# Patient Record
Sex: FEMALE | Race: WHITE | NOT HISPANIC OR LATINO | Employment: STUDENT | URBAN - METROPOLITAN AREA
[De-identification: names, ages, dates, MRNs, and addresses within clinical notes are randomized per-mention and may not be internally consistent; named-entity substitution may affect disease eponyms.]

---

## 2017-11-08 DIAGNOSIS — Z01.419 ENCOUNTER FOR GYNECOLOGICAL EXAMINATION (GENERAL) (ROUTINE) WITHOUT ABNORMAL FINDINGS: ICD-10-CM

## 2017-11-08 RX ORDER — ETONOGESTREL AND ETHINYL ESTRADIOL VAGINAL RING .015; .12 MG/D; MG/D
1 RING VAGINAL
Qty: 1 EACH | Refills: 3 | Status: SHIPPED | OUTPATIENT
Start: 2017-11-08 | End: 2018-01-31 | Stop reason: SDUPTHER

## 2018-01-31 ENCOUNTER — OFFICE VISIT (OUTPATIENT)
Dept: OBSTETRICS AND GYNECOLOGY | Facility: CLINIC | Age: 23
End: 2018-01-31
Attending: OBSTETRICS & GYNECOLOGY
Payer: COMMERCIAL

## 2018-01-31 VITALS
DIASTOLIC BLOOD PRESSURE: 80 MMHG | BODY MASS INDEX: 27.83 KG/M2 | HEIGHT: 64 IN | SYSTOLIC BLOOD PRESSURE: 108 MMHG | WEIGHT: 163 LBS

## 2018-01-31 DIAGNOSIS — Z01.419 ENCOUNTER FOR GYNECOLOGICAL EXAMINATION (GENERAL) (ROUTINE) WITHOUT ABNORMAL FINDINGS: ICD-10-CM

## 2018-01-31 DIAGNOSIS — Z12.4 ENCOUNTER FOR PAPANICOLAOU SMEAR FOR CERVICAL CANCER SCREENING: Primary | ICD-10-CM

## 2018-01-31 PROCEDURE — 88175 CYTOPATH C/V AUTO FLUID REDO: CPT

## 2018-01-31 PROCEDURE — 99999 PR PBB SHADOW E&M-EST. PATIENT-LVL III: CPT | Mod: PBBFAC,,, | Performed by: OBSTETRICS & GYNECOLOGY

## 2018-01-31 PROCEDURE — 99395 PREV VISIT EST AGE 18-39: CPT | Mod: S$GLB,,, | Performed by: OBSTETRICS & GYNECOLOGY

## 2018-01-31 RX ORDER — ETONOGESTREL AND ETHINYL ESTRADIOL VAGINAL RING .015; .12 MG/D; MG/D
1 RING VAGINAL
Qty: 3 EACH | Refills: 3 | Status: SHIPPED | OUTPATIENT
Start: 2018-01-31 | End: 2018-12-31 | Stop reason: SDUPTHER

## 2018-01-31 NOTE — PROGRESS NOTES
Subjective:       Patient ID: Kailee Messer is a 22 y.o. female.    Chief Complaint:  Annual Exam (pap normal 10/7/16 - pt has been spotting through nuvaring)      There is no problem list on file for this patient.      History of Present Illness  22 y.o. yo  here for annual exam. No gyn complaints. Doing well. Happy with Nuvaring, occasionally has spotting but she says she is not putting it in at the right time sometimes. Wants to continue. rec reminder on phone. All questions answered        Past Medical History:   Diagnosis Date    Acid reflux     Dysmenorrhea        Past Surgical History:   Procedure Laterality Date    FOOT SURGERY Left     WISDOM TOOTH EXTRACTION         OB History    Para Term  AB Living   0 0 0 0 0 0   SAB TAB Ectopic Multiple Live Births   0 0 0 0               Patient's last menstrual period was 2018.   Date of Last Pap: 10/14/2016    Review of Systems  Review of Systems   Constitutional: Negative for fatigue and unexpected weight change.   Respiratory: Negative for shortness of breath.    Cardiovascular: Negative for chest pain.   Gastrointestinal: Negative for abdominal pain, constipation, diarrhea, nausea and vomiting.   Genitourinary: Negative for dysuria.   Musculoskeletal: Negative for back pain.   Skin: Negative for rash.   Neurological: Negative for headaches.   Hematological: Does not bruise/bleed easily.   Psychiatric/Behavioral: Negative for behavioral problems.        Objective:   Physical Exam:   Constitutional: She is oriented to person, place, and time. Vital signs are normal. She appears well-developed and well-nourished. No distress.        Pulmonary/Chest: She exhibits no mass. Right breast exhibits no mass, no nipple discharge, no skin change, no tenderness, no bleeding and no swelling. Left breast exhibits no mass, no nipple discharge, no skin change, no tenderness, no bleeding and no swelling. Breasts are symmetrical.        Abdominal:  Soft. Normal appearance and bowel sounds are normal. She exhibits no distension and no mass. There is no tenderness. There is no rebound.     Genitourinary: Vagina normal and uterus normal. There is no rash, tenderness, lesion or injury on the right labia. There is no rash, tenderness, lesion or injury on the left labia. Uterus is not deviated, not enlarged, not fixed, not tender, not hosting fibroids and not experiencing uterine prolapse. Cervix is normal. Right adnexum displays no mass, no tenderness and no fullness. Left adnexum displays no mass, no tenderness and no fullness. No erythema, tenderness, rectocele, cystocele or unspecified prolapse of vaginal walls in the vagina. No vaginal discharge found. Cervix exhibits no motion tenderness, no discharge and no friability.           Musculoskeletal: Normal range of motion and moves all extremeties.      Lymphadenopathy:     She has no axillary adenopathy.        Right: No supraclavicular adenopathy present.        Left: No supraclavicular adenopathy present.    Neurological: She is alert and oriented to person, place, and time.    Skin: Skin is warm and dry.    Psychiatric: She has a normal mood and affect. Her behavior is normal. Judgment normal.        Assessment/ Plan:     1. Encounter for Papanicolaou smear for cervical cancer screening  Liquid-based pap smear, screening   2. Encounter for gynecological examination (general) (routine) without abnormal findings  etonogestrel-ethinyl estradiol (NUVARING) 0.12-0.015 mg/24 hr vaginal ring       Follow-up with me in 1 year

## 2018-12-28 ENCOUNTER — TELEPHONE (OUTPATIENT)
Dept: OBSTETRICS AND GYNECOLOGY | Facility: CLINIC | Age: 23
End: 2018-12-28

## 2018-12-31 DIAGNOSIS — Z01.419 ENCOUNTER FOR GYNECOLOGICAL EXAMINATION (GENERAL) (ROUTINE) WITHOUT ABNORMAL FINDINGS: ICD-10-CM

## 2018-12-31 RX ORDER — ETONOGESTREL AND ETHINYL ESTRADIOL VAGINAL RING .015; .12 MG/D; MG/D
1 RING VAGINAL
Qty: 3 EACH | Refills: 1 | Status: SHIPPED | OUTPATIENT
Start: 2018-12-31 | End: 2019-02-01 | Stop reason: SDUPTHER

## 2018-12-31 NOTE — TELEPHONE ENCOUNTER
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 rx needs to be sent to local pharmacy [facility]

## 2019-02-01 ENCOUNTER — LAB VISIT (OUTPATIENT)
Dept: LAB | Facility: HOSPITAL | Age: 24
End: 2019-02-01
Attending: OBSTETRICS & GYNECOLOGY
Payer: COMMERCIAL

## 2019-02-01 ENCOUNTER — OFFICE VISIT (OUTPATIENT)
Dept: OBSTETRICS AND GYNECOLOGY | Facility: CLINIC | Age: 24
End: 2019-02-01
Payer: COMMERCIAL

## 2019-02-01 VITALS
BODY MASS INDEX: 30.06 KG/M2 | WEIGHT: 176.06 LBS | SYSTOLIC BLOOD PRESSURE: 110 MMHG | HEIGHT: 64 IN | DIASTOLIC BLOOD PRESSURE: 74 MMHG

## 2019-02-01 DIAGNOSIS — Z01.419 ENCOUNTER FOR GYNECOLOGICAL EXAMINATION (GENERAL) (ROUTINE) WITHOUT ABNORMAL FINDINGS: ICD-10-CM

## 2019-02-01 DIAGNOSIS — Z12.4 ENCOUNTER FOR PAPANICOLAOU SMEAR FOR CERVICAL CANCER SCREENING: Primary | ICD-10-CM

## 2019-02-01 DIAGNOSIS — Z11.3 SCREENING EXAMINATION FOR STD (SEXUALLY TRANSMITTED DISEASE): ICD-10-CM

## 2019-02-01 PROCEDURE — 87340 HEPATITIS B SURFACE AG IA: CPT

## 2019-02-01 PROCEDURE — 88175 CYTOPATH C/V AUTO FLUID REDO: CPT

## 2019-02-01 PROCEDURE — 87491 CHLMYD TRACH DNA AMP PROBE: CPT

## 2019-02-01 PROCEDURE — 99999 PR PBB SHADOW E&M-EST. PATIENT-LVL III: CPT | Mod: PBBFAC,,, | Performed by: OBSTETRICS & GYNECOLOGY

## 2019-02-01 PROCEDURE — 86592 SYPHILIS TEST NON-TREP QUAL: CPT

## 2019-02-01 PROCEDURE — 99999 PR PBB SHADOW E&M-EST. PATIENT-LVL III: ICD-10-PCS | Mod: PBBFAC,,, | Performed by: OBSTETRICS & GYNECOLOGY

## 2019-02-01 PROCEDURE — 99395 PR PREVENTIVE VISIT,EST,18-39: ICD-10-PCS | Mod: S$GLB,,, | Performed by: OBSTETRICS & GYNECOLOGY

## 2019-02-01 PROCEDURE — 99395 PREV VISIT EST AGE 18-39: CPT | Mod: S$GLB,,, | Performed by: OBSTETRICS & GYNECOLOGY

## 2019-02-01 PROCEDURE — 86703 HIV-1/HIV-2 1 RESULT ANTBDY: CPT

## 2019-02-01 RX ORDER — ETONOGESTREL AND ETHINYL ESTRADIOL VAGINAL RING .015; .12 MG/D; MG/D
1 RING VAGINAL
Qty: 3 EACH | Refills: 3 | Status: SHIPPED | OUTPATIENT
Start: 2019-02-01 | End: 2020-01-17 | Stop reason: SDUPTHER

## 2019-02-01 NOTE — PROGRESS NOTES
Subjective:       Patient ID: Kailee Messer is a 23 y.o. female.    Chief Complaint:  Annual Exam (last pap 2018 normal)      There is no problem list on file for this patient.      History of Present Illness  23 y.o. yo  here for annual exam. No gyn complaints. Doing well. Wants STD testing. All questions answered      Past Medical History:   Diagnosis Date    Acid reflux     Dysmenorrhea        Past Surgical History:   Procedure Laterality Date    FOOT SURGERY Left     WISDOM TOOTH EXTRACTION         OB History    Para Term  AB Living   0 0 0 0 0 0   SAB TAB Ectopic Multiple Live Births   0 0 0 0               Patient's last menstrual period was 2019.   Date of Last Pap: 2018    Review of Systems  Review of Systems   Constitutional: Negative for fatigue and unexpected weight change.   Respiratory: Negative for shortness of breath.    Cardiovascular: Negative for chest pain.   Gastrointestinal: Negative for abdominal pain, constipation, diarrhea, nausea and vomiting.   Genitourinary: Negative for dysuria.   Musculoskeletal: Negative for back pain.   Skin: Negative for rash.   Neurological: Negative for headaches.   Hematological: Does not bruise/bleed easily.   Psychiatric/Behavioral: Negative for behavioral problems.        Objective:   Physical Exam:   Constitutional: She is oriented to person, place, and time. Vital signs are normal. She appears well-developed and well-nourished. No distress.        Pulmonary/Chest: She exhibits no mass. Right breast exhibits no mass, no nipple discharge, no skin change, no tenderness, no bleeding and no swelling. Left breast exhibits no mass, no nipple discharge, no skin change, no tenderness, no bleeding and no swelling. Breasts are symmetrical.        Abdominal: Soft. Normal appearance and bowel sounds are normal. She exhibits no distension and no mass. There is no tenderness. There is no rebound.     Genitourinary: Vagina normal and uterus  normal. There is no rash, tenderness, lesion or injury on the right labia. There is no rash, tenderness, lesion or injury on the left labia. Uterus is not deviated, not enlarged, not fixed, not tender, not hosting fibroids and not experiencing uterine prolapse. Cervix is normal. Right adnexum displays no mass, no tenderness and no fullness. Left adnexum displays no mass, no tenderness and no fullness. No erythema, tenderness, rectocele, cystocele or unspecified prolapse of vaginal walls in the vagina. No vaginal discharge found. Cervix exhibits no motion tenderness, no discharge and no friability.           Musculoskeletal: Normal range of motion and moves all extremeties.      Lymphadenopathy:     She has no axillary adenopathy.        Right: No supraclavicular adenopathy present.        Left: No supraclavicular adenopathy present.    Neurological: She is alert and oriented to person, place, and time.    Skin: Skin is warm and dry.    Psychiatric: She has a normal mood and affect. Her behavior is normal. Judgment normal.        Assessment/ Plan:     1. Encounter for Papanicolaou smear for cervical cancer screening  Liquid-based pap smear, screening   2. Screening examination for STD (sexually transmitted disease)  C. trachomatis/N. gonorrhoeae by AMP DNA    RPR    HIV 1/2 Ag/Ab (4th Gen)    Hepatitis B surface antigen   3. Encounter for gynecological examination (general) (routine) without abnormal findings  etonogestrel-ethinyl estradiol (NUVARING) 0.12-0.015 mg/24 hr vaginal ring       Follow-up with me in 1 year

## 2019-02-02 LAB — RPR SER QL: NORMAL

## 2019-02-04 LAB
HBV SURFACE AG SERPL QL IA: NEGATIVE
HIV 1+2 AB+HIV1 P24 AG SERPL QL IA: NEGATIVE

## 2019-02-05 LAB
C TRACH DNA SPEC QL NAA+PROBE: NOT DETECTED
N GONORRHOEA DNA SPEC QL NAA+PROBE: NOT DETECTED

## 2020-01-17 DIAGNOSIS — Z01.419 ENCOUNTER FOR GYNECOLOGICAL EXAMINATION (GENERAL) (ROUTINE) WITHOUT ABNORMAL FINDINGS: ICD-10-CM

## 2020-01-17 RX ORDER — ETONOGESTREL AND ETHINYL ESTRADIOL VAGINAL RING .015; .12 MG/D; MG/D
1 RING VAGINAL
Qty: 1 EACH | Refills: 0 | Status: SHIPPED | OUTPATIENT
Start: 2020-01-17 | End: 2020-02-07 | Stop reason: SDUPTHER

## 2020-01-17 NOTE — TELEPHONE ENCOUNTER
Lissette pt, is requesting a refill of her Nuvaring RX. Would like to have it filled at local [facility] pharmacy. Please advise once RX has been filled, thank you.

## 2020-02-07 ENCOUNTER — OFFICE VISIT (OUTPATIENT)
Dept: OBSTETRICS AND GYNECOLOGY | Facility: CLINIC | Age: 25
End: 2020-02-07
Payer: COMMERCIAL

## 2020-02-07 VITALS
WEIGHT: 185.19 LBS | HEIGHT: 64 IN | BODY MASS INDEX: 31.62 KG/M2 | SYSTOLIC BLOOD PRESSURE: 110 MMHG | DIASTOLIC BLOOD PRESSURE: 80 MMHG

## 2020-02-07 DIAGNOSIS — Z01.419 ENCOUNTER FOR GYNECOLOGICAL EXAMINATION (GENERAL) (ROUTINE) WITHOUT ABNORMAL FINDINGS: ICD-10-CM

## 2020-02-07 DIAGNOSIS — Z11.3 SCREEN FOR STD (SEXUALLY TRANSMITTED DISEASE): Primary | ICD-10-CM

## 2020-02-07 DIAGNOSIS — Z12.4 SCREENING FOR MALIGNANT NEOPLASM OF CERVIX: ICD-10-CM

## 2020-02-07 PROCEDURE — 99395 PR PREVENTIVE VISIT,EST,18-39: ICD-10-PCS | Mod: S$GLB,,, | Performed by: OBSTETRICS & GYNECOLOGY

## 2020-02-07 PROCEDURE — 99999 PR PBB SHADOW E&M-EST. PATIENT-LVL III: ICD-10-PCS | Mod: PBBFAC,,, | Performed by: OBSTETRICS & GYNECOLOGY

## 2020-02-07 PROCEDURE — 88175 CYTOPATH C/V AUTO FLUID REDO: CPT

## 2020-02-07 PROCEDURE — 99395 PREV VISIT EST AGE 18-39: CPT | Mod: S$GLB,,, | Performed by: OBSTETRICS & GYNECOLOGY

## 2020-02-07 PROCEDURE — 99999 PR PBB SHADOW E&M-EST. PATIENT-LVL III: CPT | Mod: PBBFAC,,, | Performed by: OBSTETRICS & GYNECOLOGY

## 2020-02-07 PROCEDURE — 87491 CHLMYD TRACH DNA AMP PROBE: CPT

## 2020-02-07 RX ORDER — ETONOGESTREL AND ETHINYL ESTRADIOL VAGINAL RING .015; .12 MG/D; MG/D
1 RING VAGINAL
Qty: 4 EACH | Refills: 3 | Status: SHIPPED | OUTPATIENT
Start: 2020-02-07 | End: 2021-03-15

## 2020-02-07 NOTE — PROGRESS NOTES
Subjective:       Patient ID: Kailee Messer is a 24 y.o. female.    Chief Complaint:  Well Woman (Annual --  last pap 19, Negative  )      There is no problem list on file for this patient.      History of Present Illness  24 y.o. yo  here for annual exam. No gyn complaints. Doing well on Nuvaring. All questions answered       Past Medical History:   Diagnosis Date    Acid reflux     Counseling for HPV (human papillomavirus) vaccination     Received all 3, per pt     Dysmenorrhea     Uses vaginal contraceptive ring        Past Surgical History:   Procedure Laterality Date    FOOT SURGERY Left     WISDOM TOOTH EXTRACTION         OB History    Para Term  AB Living   0 0 0 0 0 0   SAB TAB Ectopic Multiple Live Births   0 0 0 0     Obstetric Comments   Menarche ~ 10       Patient's last menstrual period was 2020 (exact date).   Date of Last Pap: 2019    Review of Systems  Review of Systems   Constitutional: Negative for fatigue and unexpected weight change.   Respiratory: Negative for shortness of breath.    Cardiovascular: Negative for chest pain.   Gastrointestinal: Negative for abdominal pain, constipation, diarrhea, nausea and vomiting.   Genitourinary: Negative for dysuria.   Musculoskeletal: Negative for back pain.   Skin: Negative for rash.   Neurological: Negative for headaches.   Hematological: Does not bruise/bleed easily.   Psychiatric/Behavioral: Negative for behavioral problems.        Objective:   Physical Exam:   Constitutional: She is oriented to person, place, and time. Vital signs are normal. She appears well-developed and well-nourished. No distress.        Pulmonary/Chest: She exhibits no mass. Right breast exhibits no mass, no nipple discharge, no skin change, no tenderness, no bleeding and no swelling. Left breast exhibits no mass, no nipple discharge, no skin change, no tenderness, no bleeding and no swelling. Breasts are symmetrical.        Abdominal:  Soft. Normal appearance and bowel sounds are normal. She exhibits no distension and no mass. There is no tenderness. There is no rebound.     Genitourinary: Vagina normal and uterus normal. There is no rash, tenderness, lesion or injury on the right labia. There is no rash, tenderness, lesion or injury on the left labia. Uterus is not deviated, not enlarged, not fixed, not tender, not hosting fibroids and not experiencing uterine prolapse. Cervix is normal. Right adnexum displays no mass, no tenderness and no fullness. Left adnexum displays no mass, no tenderness and no fullness. No erythema, tenderness, rectocele, cystocele or unspecified prolapse of vaginal walls in the vagina. No vaginal discharge found. Cervix exhibits no motion tenderness, no discharge and no friability.           Musculoskeletal: Normal range of motion and moves all extremeties.      Lymphadenopathy:     She has no axillary adenopathy.        Right: No supraclavicular adenopathy present.        Left: No supraclavicular adenopathy present.    Neurological: She is alert and oriented to person, place, and time.    Skin: Skin is warm and dry.    Psychiatric: She has a normal mood and affect. Her behavior is normal. Judgment normal.        Assessment/ Plan:     1. Screen for STD (sexually transmitted disease)  C. trachomatis/N. gonorrhoeae by AMP DNA   2. Encounter for gynecological examination (general) (routine) without abnormal findings  etonogestrel-ethinyl estradiol (NUVARING) 0.12-0.015 mg/24 hr vaginal ring   3. Screening for malignant neoplasm of cervix  Liquid-Based Pap Smear, Screening       Follow-up with me in 1 year

## 2020-02-08 LAB
C TRACH DNA SPEC QL NAA+PROBE: NOT DETECTED
N GONORRHOEA DNA SPEC QL NAA+PROBE: NOT DETECTED

## 2020-03-05 LAB
FINAL PATHOLOGIC DIAGNOSIS: NORMAL
Lab: NORMAL

## 2020-12-30 ENCOUNTER — CLINICAL SUPPORT (OUTPATIENT)
Dept: URGENT CARE | Facility: CLINIC | Age: 25
End: 2020-12-30
Payer: COMMERCIAL

## 2020-12-30 VITALS — OXYGEN SATURATION: 96 % | HEART RATE: 98 BPM | TEMPERATURE: 98 F

## 2020-12-30 DIAGNOSIS — Z11.9 ENCOUNTER FOR SCREENING EXAMINATION FOR INFECTIOUS DISEASE: Primary | ICD-10-CM

## 2020-12-30 LAB
CTP QC/QA: YES
SARS-COV-2 RDRP RESP QL NAA+PROBE: NEGATIVE

## 2020-12-30 PROCEDURE — 99211 OFF/OP EST MAY X REQ PHY/QHP: CPT | Mod: S$GLB,,, | Performed by: PHYSICIAN ASSISTANT

## 2020-12-30 PROCEDURE — 99211 PR OFFICE/OUTPT VISIT, EST, LEVL I: ICD-10-PCS | Mod: S$GLB,,, | Performed by: PHYSICIAN ASSISTANT

## 2020-12-30 PROCEDURE — U0002: ICD-10-PCS | Mod: QW,S$GLB,, | Performed by: PHYSICIAN ASSISTANT

## 2020-12-30 PROCEDURE — U0002 COVID-19 LAB TEST NON-CDC: HCPCS | Mod: QW,S$GLB,, | Performed by: PHYSICIAN ASSISTANT

## 2020-12-30 NOTE — PATIENT INSTRUCTIONS
Your test was NEGATIVE for COVID-19 (coronavirus).      You may leave home and/or return to work when the following conditions are met:  · 24 hours fever free without fever-reducing medications AND  · Improved symptoms  · You have not met the conditions of a close contact     What counts as a close contact?  · You were within 6 feet of someone who has COVID-19 for a total of 15 minutes or more (masked or unmasked).  · You provided care at home to someone who is sick with COVID-19.  · You had direct physical contact with the person (hugged or kissed them).  · You shared eating or drinking utensils.  · They sneezed, coughed, or somehow got respiratory droplets on you.     If you had a close contact:  · If possible, it is recommended that you quarantine for 14 days from the time of contact regardless of your test status.  · If you have no symptoms, quarantine may be stopped early at 10 days, but this carries a small risk of spreading the virus.  · If you have no symptoms and you have a negative COVID test on day 5 or later, quarantine may be stopped after day 7, but this also carries a small risk of spreading the virus.     Additional instructions:  · Social distance per your local guidelines  · Call ahead before visiting your doctor.  · Wear a mask when around others who do not live in your household.  · Cover your coughs and sneezes.  · Wash hands or use hand  often.      If your symptoms worsen or if you have any other concerns, please contact [facility] at [phone].     Sincerely,    [employer]

## 2021-04-15 ENCOUNTER — PATIENT MESSAGE (OUTPATIENT)
Dept: RESEARCH | Facility: HOSPITAL | Age: 26
End: 2021-04-15

## 2021-04-19 ENCOUNTER — OFFICE VISIT (OUTPATIENT)
Dept: OBSTETRICS AND GYNECOLOGY | Facility: CLINIC | Age: 26
End: 2021-04-19
Attending: OBSTETRICS & GYNECOLOGY
Payer: COMMERCIAL

## 2021-04-19 VITALS
HEIGHT: 64 IN | WEIGHT: 182.13 LBS | BODY MASS INDEX: 31.09 KG/M2 | SYSTOLIC BLOOD PRESSURE: 118 MMHG | DIASTOLIC BLOOD PRESSURE: 86 MMHG

## 2021-04-19 DIAGNOSIS — F41.1 GAD (GENERALIZED ANXIETY DISORDER): ICD-10-CM

## 2021-04-19 DIAGNOSIS — Z11.3 SCREEN FOR STD (SEXUALLY TRANSMITTED DISEASE): ICD-10-CM

## 2021-04-19 DIAGNOSIS — Z12.4 SCREENING FOR CERVICAL CANCER: Primary | ICD-10-CM

## 2021-04-19 PROCEDURE — 99999 PR PBB SHADOW E&M-EST. PATIENT-LVL III: CPT | Mod: PBBFAC,,, | Performed by: OBSTETRICS & GYNECOLOGY

## 2021-04-19 PROCEDURE — 88175 CYTOPATH C/V AUTO FLUID REDO: CPT | Performed by: OBSTETRICS & GYNECOLOGY

## 2021-04-19 PROCEDURE — 99999 PR PBB SHADOW E&M-EST. PATIENT-LVL III: ICD-10-PCS | Mod: PBBFAC,,, | Performed by: OBSTETRICS & GYNECOLOGY

## 2021-04-19 PROCEDURE — 99395 PREV VISIT EST AGE 18-39: CPT | Mod: S$GLB,,, | Performed by: OBSTETRICS & GYNECOLOGY

## 2021-04-19 PROCEDURE — 99395 PR PREVENTIVE VISIT,EST,18-39: ICD-10-PCS | Mod: S$GLB,,, | Performed by: OBSTETRICS & GYNECOLOGY

## 2021-04-19 RX ORDER — FLUOXETINE 10 MG/1
10 CAPSULE ORAL DAILY
Qty: 90 CAPSULE | Refills: 3 | Status: SHIPPED | OUTPATIENT
Start: 2021-04-19 | End: 2022-02-28

## 2021-04-21 ENCOUNTER — TELEPHONE (OUTPATIENT)
Dept: OBSTETRICS AND GYNECOLOGY | Facility: CLINIC | Age: 26
End: 2021-04-21

## 2021-04-21 LAB
C TRACH RRNA SPEC QL NAA+PROBE: NEGATIVE
N GONORRHOEA RRNA SPEC QL NAA+PROBE: NEGATIVE

## 2021-04-26 LAB
CLINICAL INFO: NORMAL
CYTO CVX: NORMAL
CYTOLOGIST CVX/VAG CYTO: NORMAL
CYTOLOGIST CVX/VAG CYTO: NORMAL
CYTOLOGY CMNT CVX/VAG CYTO-IMP: NORMAL
CYTOLOGY PAP THIN PREP EXPLANATION: NORMAL
DATE OF PREVIOUS PAP: NORMAL
DATE PREVIOUS BX: NO
LMP START DATE: NORMAL
SPECIMEN SOURCE CVX/VAG CYTO: NORMAL
STAT OF ADQ CVX/VAG CYTO-IMP: NORMAL

## 2021-05-17 ENCOUNTER — OFFICE VISIT (OUTPATIENT)
Dept: OBSTETRICS AND GYNECOLOGY | Facility: CLINIC | Age: 26
End: 2021-05-17
Attending: OBSTETRICS & GYNECOLOGY
Payer: COMMERCIAL

## 2021-05-17 VITALS
DIASTOLIC BLOOD PRESSURE: 82 MMHG | HEIGHT: 64 IN | WEIGHT: 184.63 LBS | BODY MASS INDEX: 31.52 KG/M2 | SYSTOLIC BLOOD PRESSURE: 118 MMHG

## 2021-05-17 DIAGNOSIS — F41.1 GAD (GENERALIZED ANXIETY DISORDER): Primary | ICD-10-CM

## 2021-05-17 PROCEDURE — 99999 PR PBB SHADOW E&M-EST. PATIENT-LVL III: ICD-10-PCS | Mod: PBBFAC,,, | Performed by: OBSTETRICS & GYNECOLOGY

## 2021-05-17 PROCEDURE — 99212 PR OFFICE/OUTPT VISIT, EST, LEVL II, 10-19 MIN: ICD-10-PCS | Mod: S$GLB,,, | Performed by: OBSTETRICS & GYNECOLOGY

## 2021-05-17 PROCEDURE — 99212 OFFICE O/P EST SF 10 MIN: CPT | Mod: S$GLB,,, | Performed by: OBSTETRICS & GYNECOLOGY

## 2021-05-17 PROCEDURE — 99999 PR PBB SHADOW E&M-EST. PATIENT-LVL III: CPT | Mod: PBBFAC,,, | Performed by: OBSTETRICS & GYNECOLOGY

## 2021-12-23 ENCOUNTER — PATIENT MESSAGE (OUTPATIENT)
Dept: ADMINISTRATIVE | Facility: OTHER | Age: 26
End: 2021-12-23
Payer: COMMERCIAL

## 2021-12-23 ENCOUNTER — OFFICE VISIT (OUTPATIENT)
Dept: URGENT CARE | Facility: CLINIC | Age: 26
End: 2021-12-23
Payer: COMMERCIAL

## 2021-12-23 VITALS
BODY MASS INDEX: 31.41 KG/M2 | SYSTOLIC BLOOD PRESSURE: 136 MMHG | TEMPERATURE: 97 F | WEIGHT: 184 LBS | HEART RATE: 88 BPM | HEIGHT: 64 IN | RESPIRATION RATE: 16 BRPM | DIASTOLIC BLOOD PRESSURE: 89 MMHG | OXYGEN SATURATION: 98 %

## 2021-12-23 DIAGNOSIS — R19.7 DIARRHEA, UNSPECIFIED TYPE: ICD-10-CM

## 2021-12-23 DIAGNOSIS — U07.1 COVID-19: Primary | ICD-10-CM

## 2021-12-23 LAB
CTP QC/QA: YES
CTP QC/QA: YES
POC MOLECULAR INFLUENZA A AGN: NEGATIVE
POC MOLECULAR INFLUENZA B AGN: NEGATIVE
SARS-COV-2 RDRP RESP QL NAA+PROBE: POSITIVE

## 2021-12-23 PROCEDURE — U0002: ICD-10-PCS | Mod: QW,S$GLB,, | Performed by: PHYSICIAN ASSISTANT

## 2021-12-23 PROCEDURE — U0002 COVID-19 LAB TEST NON-CDC: HCPCS | Mod: QW,S$GLB,, | Performed by: PHYSICIAN ASSISTANT

## 2021-12-23 PROCEDURE — 99212 PR OFFICE/OUTPT VISIT, EST, LEVL II, 10-19 MIN: ICD-10-PCS | Mod: S$GLB,,, | Performed by: PHYSICIAN ASSISTANT

## 2021-12-23 PROCEDURE — 87502 INFLUENZA DNA AMP PROBE: CPT | Mod: QW,S$GLB,, | Performed by: PHYSICIAN ASSISTANT

## 2021-12-23 PROCEDURE — 99212 OFFICE O/P EST SF 10 MIN: CPT | Mod: S$GLB,,, | Performed by: PHYSICIAN ASSISTANT

## 2021-12-23 PROCEDURE — 87502 POCT INFLUENZA A/B MOLECULAR: ICD-10-PCS | Mod: QW,S$GLB,, | Performed by: PHYSICIAN ASSISTANT

## 2021-12-23 RX ORDER — COVID-19 TEST SPECIMEN COLLECT
MISCELLANEOUS MISCELLANEOUS
COMMUNITY
Start: 2021-12-13 | End: 2022-05-27

## 2022-02-22 ENCOUNTER — PATIENT MESSAGE (OUTPATIENT)
Dept: RESEARCH | Facility: HOSPITAL | Age: 27
End: 2022-02-22
Payer: COMMERCIAL

## 2022-02-24 ENCOUNTER — PATIENT MESSAGE (OUTPATIENT)
Dept: OBSTETRICS AND GYNECOLOGY | Facility: CLINIC | Age: 27
End: 2022-02-24
Payer: COMMERCIAL

## 2022-02-28 RX ORDER — FLUOXETINE HYDROCHLORIDE 20 MG/1
20 CAPSULE ORAL DAILY
Qty: 90 CAPSULE | Refills: 3 | Status: SHIPPED | OUTPATIENT
Start: 2022-02-28 | End: 2023-03-06

## 2022-02-28 NOTE — TELEPHONE ENCOUNTER
Prozac 20 mg sent.   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

## 2022-05-27 ENCOUNTER — OFFICE VISIT (OUTPATIENT)
Dept: OBSTETRICS AND GYNECOLOGY | Facility: CLINIC | Age: 27
End: 2022-05-27
Attending: OBSTETRICS & GYNECOLOGY
Payer: COMMERCIAL

## 2022-05-27 VITALS
SYSTOLIC BLOOD PRESSURE: 120 MMHG | DIASTOLIC BLOOD PRESSURE: 80 MMHG | HEIGHT: 64 IN | BODY MASS INDEX: 32.69 KG/M2 | WEIGHT: 191.5 LBS

## 2022-05-27 DIAGNOSIS — Z01.419 ENCOUNTER FOR GYNECOLOGICAL EXAMINATION (GENERAL) (ROUTINE) WITHOUT ABNORMAL FINDINGS: ICD-10-CM

## 2022-05-27 DIAGNOSIS — Z11.51 ENCOUNTER FOR SCREENING FOR HUMAN PAPILLOMAVIRUS (HPV): ICD-10-CM

## 2022-05-27 DIAGNOSIS — Z12.4 ENCOUNTER FOR PAPANICOLAOU SMEAR FOR CERVICAL CANCER SCREENING: Primary | ICD-10-CM

## 2022-05-27 DIAGNOSIS — Z11.3 SCREENING EXAMINATION FOR STD (SEXUALLY TRANSMITTED DISEASE): ICD-10-CM

## 2022-05-27 PROCEDURE — 88141 CYTOPATH C/V INTERPRET: CPT | Mod: ,,, | Performed by: PATHOLOGY

## 2022-05-27 PROCEDURE — 99395 PREV VISIT EST AGE 18-39: CPT | Mod: S$GLB,,, | Performed by: OBSTETRICS & GYNECOLOGY

## 2022-05-27 PROCEDURE — 99999 PR PBB SHADOW E&M-EST. PATIENT-LVL III: CPT | Mod: PBBFAC,,, | Performed by: OBSTETRICS & GYNECOLOGY

## 2022-05-27 PROCEDURE — 99395 PR PREVENTIVE VISIT,EST,18-39: ICD-10-PCS | Mod: S$GLB,,, | Performed by: OBSTETRICS & GYNECOLOGY

## 2022-05-27 PROCEDURE — 87591 N.GONORRHOEAE DNA AMP PROB: CPT | Performed by: OBSTETRICS & GYNECOLOGY

## 2022-05-27 PROCEDURE — 87491 CHLMYD TRACH DNA AMP PROBE: CPT | Performed by: OBSTETRICS & GYNECOLOGY

## 2022-05-27 PROCEDURE — 88141 PR  CYTOPATH CERV/VAG INTERPRET: ICD-10-PCS | Mod: ,,, | Performed by: PATHOLOGY

## 2022-05-27 PROCEDURE — 88175 CYTOPATH C/V AUTO FLUID REDO: CPT | Performed by: PATHOLOGY

## 2022-05-27 PROCEDURE — 87624 HPV HI-RISK TYP POOLED RSLT: CPT | Performed by: OBSTETRICS & GYNECOLOGY

## 2022-05-27 PROCEDURE — 99999 PR PBB SHADOW E&M-EST. PATIENT-LVL III: ICD-10-PCS | Mod: PBBFAC,,, | Performed by: OBSTETRICS & GYNECOLOGY

## 2022-05-27 RX ORDER — ETONOGESTREL AND ETHINYL ESTRADIOL VAGINAL RING .015; .12 MG/D; MG/D
RING VAGINAL
Qty: 3 EACH | Refills: 3 | Status: SHIPPED | OUTPATIENT
Start: 2022-05-27 | End: 2023-06-29 | Stop reason: SDUPTHER

## 2022-05-27 NOTE — PROGRESS NOTES
Subjective:       Patient ID: Kailee Messer is a 26 y.o. female.    Chief Complaint:  Annual Exam (Last pap  normal )        History of Present Illness  Kailee Messer is a 26 y.o. female  who presents for annual. Doing well on Nuvaring. Still taking Prozac. Would like referral to PCP to discuss anxiety and other primary care issues, will place referral. Otherwise doing well. All questions answered    Patient's last menstrual period was 2022.   Date of Last Pap: 2022    Review of Systems  Review of Systems   Constitutional: Negative for chills and fever.        Objective:   Physical Exam:   Constitutional: She is oriented to person, place, and time. Vital signs are normal. She appears well-developed and well-nourished. No distress.        Pulmonary/Chest: She exhibits no mass. Right breast exhibits no mass, no nipple discharge, no skin change, no tenderness, no bleeding and no swelling. Left breast exhibits no mass, no nipple discharge, no skin change, no tenderness, no bleeding and no swelling. Breasts are symmetrical.        Abdominal: Soft. Bowel sounds are normal. She exhibits no distension and no mass. There is no abdominal tenderness. There is no rebound.     Genitourinary:    Vagina and uterus normal.   There is no rash, tenderness, lesion or injury on the right labia. There is no rash, tenderness, lesion or injury on the left labia. Cervix is normal. Right adnexum displays no mass, no tenderness and no fullness. Left adnexum displays no mass, no tenderness and no fullness. No erythema,  no vaginal discharge, tenderness, rectocele, cystocele or unspecified prolapse of vaginal walls in the vagina. Cervix exhibits no motion tenderness, no discharge and no friability. Uterus is not deviated, not enlarged, not fixed, not tender and not hosting fibroids.           Musculoskeletal: Normal range of motion and moves all extremeties.      Lymphadenopathy:        Right: No supraclavicular  adenopathy present.        Left: No supraclavicular adenopathy present.    Neurological: She is alert and oriented to person, place, and time.    Skin: Skin is warm and dry.    Psychiatric: She has a normal mood and affect. Her behavior is normal. Judgment normal.        Assessment/ Plan:     1. Encounter for Papanicolaou smear for cervical cancer screening  Liquid-Based Pap Smear, Screening   2. Screening examination for STD (sexually transmitted disease)  RPR    HIV 1/2 Ag/Ab (4th Gen)    Hepatitis B Surface Antigen    C. trachomatis/N. gonorrhoeae by AMP DNA   3. Encounter for gynecological examination (general) (routine) without abnormal findings  etonogestreL-ethinyl estradioL (NUVARING) 0.12-0.015 mg/24 hr vaginal ring       Follow up in about 1 year (around 5/27/2023) for Annual exam.    As of April 1, 2021, the Cures Act has been passed nationally. This new law requires that all doctors progress notes, lab results, pathology reports and radiology reports be released IMMEDIATELY to the patient in the patient portal. That means that the results are released to you at the EXACT same time they are released to me. Therefore, with all of the patients that I have I am not able to reply to each patient exactly when the results come in. So there will be a delay from when you see the results to when I see them and have time to come up with a response to send you. Also I only see these results when I am on the computer at work. So if the results come in over the weekend or after 5 pm of a work day, I will not see them until the next business day. As you can tell, this is a challenge as a physician to give every patient the quick response they hope for and deserve. So please be patient! Thanks for understanding, Dr. Mclaughlin

## 2022-05-30 LAB
C TRACH DNA SPEC QL NAA+PROBE: NOT DETECTED
N GONORRHOEA DNA SPEC QL NAA+PROBE: NOT DETECTED

## 2022-06-01 LAB
FINAL PATHOLOGIC DIAGNOSIS: ABNORMAL
Lab: ABNORMAL

## 2022-06-07 ENCOUNTER — TELEPHONE (OUTPATIENT)
Dept: OBSTETRICS AND GYNECOLOGY | Facility: CLINIC | Age: 27
End: 2022-06-07
Payer: COMMERCIAL

## 2022-06-07 DIAGNOSIS — Z11.51 SCREENING FOR HPV (HUMAN PAPILLOMAVIRUS): Primary | ICD-10-CM

## 2022-06-07 NOTE — TELEPHONE ENCOUNTER
Cytology called stating that Dr. Mclaughlin requested HPV for pt but there is no order.  Cytology is requesting that we put in orders in Epic for pt so she can send it off.    HPV orders placed.

## 2022-06-15 LAB
HPV HR 12 DNA SPEC QL NAA+PROBE: POSITIVE
HPV16 AG SPEC QL: NEGATIVE
HPV18 DNA SPEC QL NAA+PROBE: NEGATIVE

## 2022-06-22 ENCOUNTER — PROCEDURE VISIT (OUTPATIENT)
Dept: OBSTETRICS AND GYNECOLOGY | Facility: CLINIC | Age: 27
End: 2022-06-22
Attending: OBSTETRICS & GYNECOLOGY
Payer: COMMERCIAL

## 2022-06-22 VITALS
HEIGHT: 64 IN | SYSTOLIC BLOOD PRESSURE: 104 MMHG | DIASTOLIC BLOOD PRESSURE: 76 MMHG | BODY MASS INDEX: 32.63 KG/M2 | WEIGHT: 191.13 LBS

## 2022-06-22 DIAGNOSIS — R87.810 CERVICAL HIGH RISK HPV (HUMAN PAPILLOMAVIRUS) TEST POSITIVE: ICD-10-CM

## 2022-06-22 DIAGNOSIS — R87.612 LGSIL ON PAP SMEAR OF CERVIX: Primary | ICD-10-CM

## 2022-06-22 PROCEDURE — 88305 TISSUE EXAM BY PATHOLOGIST: CPT | Mod: 26,,, | Performed by: PATHOLOGY

## 2022-06-22 PROCEDURE — 88305 TISSUE EXAM BY PATHOLOGIST: CPT | Performed by: PATHOLOGY

## 2022-06-22 PROCEDURE — 57454 COLPOSCOPY: ICD-10-PCS | Mod: S$GLB,,, | Performed by: OBSTETRICS & GYNECOLOGY

## 2022-06-22 PROCEDURE — 88342 IMHCHEM/IMCYTCHM 1ST ANTB: CPT | Mod: 26,,, | Performed by: PATHOLOGY

## 2022-06-22 PROCEDURE — 57454 BX/CURETT OF CERVIX W/SCOPE: CPT | Mod: S$GLB,,, | Performed by: OBSTETRICS & GYNECOLOGY

## 2022-06-22 PROCEDURE — 88342 CHG IMMUNOCYTOCHEMISTRY: ICD-10-PCS | Mod: 26,,, | Performed by: PATHOLOGY

## 2022-06-22 PROCEDURE — 88342 IMHCHEM/IMCYTCHM 1ST ANTB: CPT | Performed by: PATHOLOGY

## 2022-06-22 PROCEDURE — 88305 TISSUE EXAM BY PATHOLOGIST: ICD-10-PCS | Mod: 26,,, | Performed by: PATHOLOGY

## 2022-07-01 LAB
FINAL PATHOLOGIC DIAGNOSIS: NORMAL
Lab: NORMAL

## 2022-07-04 ENCOUNTER — OFFICE VISIT (OUTPATIENT)
Dept: URGENT CARE | Facility: CLINIC | Age: 27
End: 2022-07-04
Payer: COMMERCIAL

## 2022-07-04 VITALS
SYSTOLIC BLOOD PRESSURE: 131 MMHG | HEART RATE: 77 BPM | TEMPERATURE: 99 F | BODY MASS INDEX: 31.65 KG/M2 | HEIGHT: 65 IN | DIASTOLIC BLOOD PRESSURE: 90 MMHG | OXYGEN SATURATION: 96 % | RESPIRATION RATE: 20 BRPM | WEIGHT: 190 LBS
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
## 2022-07-04 DIAGNOSIS — Z23 NEED FOR DIPHTHERIA-TETANUS-PERTUSSIS (TDAP) VACCINE: ICD-10-CM

## 2022-07-04 DIAGNOSIS — S61.512A LACERATION OF LEFT WRIST, INITIAL ENCOUNTER: ICD-10-CM

## 2022-07-04 PROCEDURE — 99214 PR OFFICE/OUTPT VISIT, EST, LEVL IV, 30-39 MIN: ICD-10-PCS | Mod: 25,S$GLB,, | Performed by: NURSE PRACTITIONER

## 2022-07-04 PROCEDURE — 90715 TDAP VACCINE 7 YRS/> IM: CPT | Mod: S$GLB,,, | Performed by: NURSE PRACTITIONER

## 2022-07-04 PROCEDURE — 90471 TDAP VACCINE GREATER THAN OR EQUAL TO 7YO IM: ICD-10-PCS | Mod: S$GLB,,, | Performed by: NURSE PRACTITIONER

## 2022-07-04 PROCEDURE — 90715 TDAP VACCINE GREATER THAN OR EQUAL TO 7YO IM: ICD-10-PCS | Mod: S$GLB,,, | Performed by: NURSE PRACTITIONER

## 2022-07-04 PROCEDURE — 90471 IMMUNIZATION ADMIN: CPT | Mod: S$GLB,,, | Performed by: NURSE PRACTITIONER

## 2022-07-04 PROCEDURE — 99214 OFFICE O/P EST MOD 30 MIN: CPT | Mod: 25,S$GLB,, | Performed by: NURSE PRACTITIONER

## 2022-07-04 RX ORDER — MUPIROCIN 20 MG/G
OINTMENT TOPICAL
Qty: 22 G | Refills: 0 | Status: SHIPPED | OUTPATIENT
Start: 2022-07-04 | End: 2022-09-14

## 2022-07-04 NOTE — PROGRESS NOTES
"Subjective:       Patient ID: Kailee Messer is a 26 y.o. female.    Vitals:  height is 5' 5" (1.651 m) and weight is 86.2 kg (190 lb). Her oral temperature is 98.5 °F (36.9 °C). Her blood pressure is 131/90 (abnormal) and her pulse is 77. Her respiration is 20 and oxygen saturation is 96%.     Chief Complaint: Animal Bite (Left wrist/ hand)    This is a 26 y.o. female who presents today with a chief complaint of dog bite to her left wrist/hand, that happen on last night. Pt explain that she is not in any serios pain and she did not used any otc pain meds to treat herself.    Provider note begins below:    Patient bit during dog training session to left posterior wrist.  There is a superficial 1 cm laceration to the affected area.  Hemostasis prior to admit.  Patient is able to  adequately with the affected hand, splay fingers, thumbs up, okay sign.  Patient states there is some pain and tenderness with flexion and extension of fingers to the area of her wrist affected by the bite.  She also states there is a little localized numbness/paresthesia.    Patient denies fever.  There is no erythema or fever to the affected lesion.    Patient is unsure tetanus status but thinks it may have been 2013. I suggested she renew her tetanus as she is about to go white [employer] and is not 100% sure of her tetanus date.    Animal Bite   The incident occurred yesterday. The incident occurred at home. She came to the ER via personal transport. There is an injury to the left wrist and left hand. The pain is mild. It is unlikely that a foreign body is present.       Skin: Negative for erythema.       Objective:      Physical Exam   Constitutional: She is oriented to person, place, and time. She appears well-developed. She does not appear ill. No distress.   HENT:   Head: Normocephalic and atraumatic. Head is without abrasion, without contusion and without laceration.   Ears:   Right Ear: External ear normal.   Left Ear: " External ear normal.   Nose: Nose normal.   Mouth/Throat: Oropharynx is clear and moist and mucous membranes are normal.   Eyes: Conjunctivae, EOM and lids are normal. Pupils are equal, round, and reactive to light.   Neck: Trachea normal and phonation normal. Neck supple.   Cardiovascular: Normal rate and regular rhythm.   Pulmonary/Chest: Effort normal. No respiratory distress.   Musculoskeletal: Normal range of motion.         General: Normal range of motion.      Left wrist: She exhibits laceration.   Neurological: She is alert and oriented to person, place, and time.   Skin: Skin is warm, dry, intact and no rash. Capillary refill takes less than 2 seconds. Lacerations - upper ext.:  left wristNo abrasion, No burn, No bruising, No erythema and No ecchymosis        Psychiatric: Her speech is normal and behavior is normal. Judgment and thought content normal.   Nursing note and vitals reviewed.        Assessment:       1. Dog bite, initial encounter    2. Laceration of left wrist, initial encounter    3. Need for diphtheria-tetanus-pertussis (Tdap) vaccine          Plan:       Laceration cleaned with povidone and dressed with Bactroban and nonadhesive dressing.  Patient tolerated procedure well.    Dog bite, initial encounter  -     mupirocin (BACTROBAN) 2 % ointment; Apply to affected area 3 times daily  Dispense: 22 g; Refill: 0    Laceration of left wrist, initial encounter  -     mupirocin (BACTROBAN) 2 % ointment; Apply to affected area 3 times daily  Dispense: 22 g; Refill: 0    Need for diphtheria-tetanus-pertussis (Tdap) vaccine  -     (In Office Administered) Tdap Vaccine

## 2022-09-09 ENCOUNTER — TELEPHONE (OUTPATIENT)
Dept: ORTHOPEDICS | Facility: CLINIC | Age: 27
End: 2022-09-09
Payer: COMMERCIAL

## 2022-09-09 DIAGNOSIS — R52 PAIN: Primary | ICD-10-CM

## 2022-09-14 ENCOUNTER — HOSPITAL ENCOUNTER (OUTPATIENT)
Dept: RADIOLOGY | Facility: HOSPITAL | Age: 27
Discharge: HOME | End: 2022-09-14
Attending: PHYSICIAN ASSISTANT
Payer: COMMERCIAL

## 2022-09-14 ENCOUNTER — OFFICE VISIT (OUTPATIENT)
Dept: ORTHOPEDICS | Facility: CLINIC | Age: 27
End: 2022-09-14
Payer: COMMERCIAL

## 2022-09-14 VITALS
SYSTOLIC BLOOD PRESSURE: 123 MMHG | BODY MASS INDEX: 32.49 KG/M2 | DIASTOLIC BLOOD PRESSURE: 87 MMHG | HEART RATE: 83 BPM | WEIGHT: 195 LBS | HEIGHT: 65 IN

## 2022-09-14 DIAGNOSIS — S80.01XA CONTUSION OF RIGHT KNEE, INITIAL ENCOUNTER: Primary | ICD-10-CM

## 2022-09-14 DIAGNOSIS — R52 PAIN: ICD-10-CM

## 2022-09-14 PROCEDURE — 99999 PR PBB SHADOW E&M-EST. PATIENT-LVL III: ICD-10-PCS | Mod: PBBFAC,,, | Performed by: ORTHOPAEDIC SURGERY

## 2022-09-14 PROCEDURE — 73564 X-RAY EXAM KNEE 4 OR MORE: CPT | Mod: 26,RT,, | Performed by: RADIOLOGY

## 2022-09-14 PROCEDURE — 73564 XR KNEE COMP 4 OR MORE VIEWS RIGHT: ICD-10-PCS | Mod: 26,RT,, | Performed by: RADIOLOGY

## 2022-09-14 PROCEDURE — 99203 OFFICE O/P NEW LOW 30 MIN: CPT | Mod: S$GLB,,, | Performed by: ORTHOPAEDIC SURGERY

## 2022-09-14 PROCEDURE — 99203 PR OFFICE/OUTPT VISIT, NEW, LEVL III, 30-44 MIN: ICD-10-PCS | Mod: S$GLB,,, | Performed by: ORTHOPAEDIC SURGERY

## 2022-09-14 PROCEDURE — 99999 PR PBB SHADOW E&M-EST. PATIENT-LVL III: CPT | Mod: PBBFAC,,, | Performed by: ORTHOPAEDIC SURGERY

## 2022-09-14 PROCEDURE — 73564 X-RAY EXAM KNEE 4 OR MORE: CPT | Mod: TC,FY,RT

## 2022-09-14 NOTE — PROGRESS NOTES
[facility], Orthopedics and Sports Medicine  [facility]    New Patient Knee Office Visit  09/14/2022       Subjective:      Kailee Messer is a 27 y.o. female referred by Joshua Chavez for evaluation and treatment of right knee pain. This is evaluated as a personal injury.  The patient has the following symptoms: pain located anterior lateral knee .  The symptoms began 4 months ago when she started training for a marathon and fell while running.  She did not feel any pop or have any subsequent swelling in knee.  She has had lateral sided knee pain since the injury.  Feels like it needs to pop.  Has no instability events.  No other injuries noted. No history of knee problems right side.     Patient works in an office, desk job.     Outside reports reviewed: historical medical records and office notes.    Past Medical History:   Diagnosis Date    Acid reflux     Counseling for HPV (human papillomavirus) vaccination 2020    Received all 3, per pt     Dysmenorrhea     Uses vaginal contraceptive ring        Patient Active Problem List   Diagnosis    Contusion of right knee       Past Surgical History:   Procedure Laterality Date    FOOT SURGERY Left     WISDOM TOOTH EXTRACTION          Current Outpatient Medications   Medication Instructions    etonogestreL-ethinyl estradioL (NUVARING) 0.12-0.015 mg/24 hr vaginal ring INSERT ONE RING VAGINALLY  EVERY 21 DAYS CONTINUOUSLY.    FLUoxetine 20 mg, Oral, Daily        Review of patient's allergies indicates:  No Known Allergies    Social History     Socioeconomic History    Marital status: Single   Tobacco Use    Smoking status: Never    Smokeless tobacco: Never   Substance and Sexual Activity    Alcohol use: Yes     Comment: 3-4 x per week     Drug use: No    Sexual activity: Yes     Partners: Male     Birth control/protection: Inserts     Comment: Single:   Nuvaring continuous        Family History   Problem Relation Age of Onset    Hypertension  Father     Hyperlipidemia Mother     Hypertension Paternal Grandfather     Diabetes Paternal Grandfather     Hypertension Paternal Grandmother     Hypertension Sister     Breast cancer Paternal Aunt         great aunt    Cancer Paternal Uncle         great uncle    Colon cancer Neg Hx     Ovarian cancer Neg Hx          Review of Systems   Constitutional: Negative for chills and fever.   HENT:  Negative for hearing loss.    Eyes:  Negative for blurred vision.   Cardiovascular:  Negative for chest pain.   Respiratory:  Negative for shortness of breath.    Gastrointestinal:  Negative for abdominal pain.   Neurological:  Negative for light-headedness.          Objective:      General    Constitutional: She is oriented to person, place, and time. She appears well-developed and well-nourished.   HENT:   Head: Normocephalic and atraumatic.   Eyes: EOM are normal.   Cardiovascular:  Normal rate.            Pulmonary/Chest: Effort normal.   Neurological: She is alert and oriented to person, place, and time.   Psychiatric: She has a normal mood and affect.           Right Knee Exam     Inspection   Erythema: absent  Swelling: absent  Effusion: absent    Tenderness   The patient is tender to palpation of the lateral joint line.    Range of Motion   Extension:  0   Flexion:  130     Tests   Meniscus   Amber:  Medial - negative Lateral - positive  Ligament Examination   Lachman: normal (-1 to 2mm)   PCL-Posterior Drawer: normal (0 to 2mm)     MCL - Valgus: normal (0 to 2mm)  LCL - Varus: normal  Patella   Patellar apprehension: negative    Other   Sensation: normal    Left Knee Exam     Inspection   Erythema: absent  Swelling: absent  Effusion: absent    Tenderness   The patient is experiencing no tenderness.     Range of Motion   Extension:  0   Flexion:  130     Tests   Meniscus   Amber:  Medial - negative Lateral - negative  Stability   Lachman: normal (-1 to 2mm)   PCL-Posterior Drawer: normal (0 to 2mm)  MCL - Valgus:  normal (0 to 2mm)  LCL - Varus: normal (0 to 2mm)  Patella   Patellar apprehension: negative    Other   Sensation: normal    Muscle Strength   Right Lower Extremity   Quadriceps:  5/5   Hamstrin/5   Left Lower Extremity   Quadriceps:  5/5   Hamstrin/5     Vascular Exam     Right Pulses    Posterior Tibial:      2+        Left Pulses    Posterior Tibial:      2+        Imaging:  Radiographs of the right knee taken taken 2022 were personally reviewed from the [facility] EMR.  Multiple views of the knee are available today for review, including a standing AP, a standing notch view, lateral view, and a merchant view.  The tibiofemoral compartment demonstrates minimal degenerative changes.  The patellofemoral compartment demonstrates minimal degenerative changes.  No acute fractures or dislocations are noted in these images.       Procedures          Assessment:       Kailee Messer is a 27 y.o. female seen in the office today. The encounter diagnosis was Contusion of right knee, initial encounter.  Non-operative treatment is recommended at this time. The patient will be sent to therapy.  Pain not severe, did not offer CSI.  Will use tylenol as needed.  Will need to stop running/training at this time.  If better in 6 weeks then can gradually resume running. If not better will consider MRI or CSI.  The natural history and expected course discussed with patient. Various treatment options were discussed, including their risks and benefits. All of the patient's questions were answered.     Plan:      Physical therapy and rehabilitation treatment.  Tylenol 650mg TID, PRN pain.  Follow up in 6 weeks.          Jose Rodrigues IV, MD   of Clinical Orthopedics  Department of Orthopedic Surgery  [facility]  Office: [phone]  Website: [email or web address]      Orders Placed This Encounter    Ambulatory referral/consult to Physical/Occupational Therapy

## 2022-09-26 ENCOUNTER — CLINICAL SUPPORT (OUTPATIENT)
Dept: REHABILITATION | Facility: HOSPITAL | Age: 27
End: 2022-09-26
Payer: COMMERCIAL

## 2022-09-26 DIAGNOSIS — S80.01XA CONTUSION OF RIGHT KNEE, INITIAL ENCOUNTER: ICD-10-CM

## 2022-09-26 PROCEDURE — 97161 PT EVAL LOW COMPLEX 20 MIN: CPT

## 2022-09-26 PROCEDURE — 97110 THERAPEUTIC EXERCISES: CPT

## 2022-09-27 NOTE — PLAN OF CARE
[facility]  Physical Therapy Initial Evaluation    Date: 9/26/2022   Name: Kailee Messer  Clinic Number: [id number]    Therapy Diagnosis: No diagnosis found.  Physician: Jose Rodrigues IV, MD    Physician Orders: PT Eval and Treat   Medical Diagnosis from Referral: S80.01XA (ICD-10-CM) - Contusion of right knee, initial encounter  Evaluation Date: 9/26/2022  Authorization Period Expiration: 09/14/2023  Plan of Care Expiration: 12/31/2022  Visit # / Visits authorized: 1/ 1    Time In: 1505  Time Out: 1600  Total Appointment Time (timed & untimed codes): 55 minutes    Precautions: Standard    Subjective   Date of onset: ~4-6 weeks ago  History of current condition - Kailee reports:     Patient reports onset of R knee pain after trip and fall onto knee. She does not recall twisting mechanism to injury, but did fall directly on knee. Since then she has continued to have R knee discomfort. X-ray negative for fracture. No MRI.    Patient is a runner and was training for a half marathon. She was able to complete a 5k recently doing a walk/jog with mod discomfort in her knee. Currently she has pain primarily with stairs, prolonged walking and weight bearing, and with jogging immediately. She has stopped running due to pain.    She would like to be able to complete half marathon at a 16 min mile pace.    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    Goal - return to running; improve ayah to ADLs.    Per Ortho Note 9/14/2022:  Kailee Messer is a 27 y.o. female referred by [facility] for evaluation and treatment of right knee pain. This is evaluated as a personal injury.  The patient has the following symptoms: pain located anterior lateral knee .  The symptoms began 4 months ago when she started training for a marathon and fell while running.  She did not feel any pop or have any subsequent swelling in knee.  She has had lateral sided knee pain since the  injury.  Feels like it needs to pop.  Has no instability events.  No other injuries noted. No history of knee problems right side.      Patient works in an office, desk job.      Outside reports reviewed: historical medical records and office notes.  Medical History:   Past Medical History:   Diagnosis Date    Acid reflux     Counseling for HPV (human papillomavirus) vaccination 2020    Received all 3, per pt     Dysmenorrhea     Uses vaginal contraceptive ring        Surgical History:   Kailee Messer  has a past surgical history that includes [place] tooth extraction and Foot surgery (Left).    Medications:   Kailee has a current medication list which includes the following prescription(s): etonogestrel-ethinyl estradiol and fluoxetine.    Allergies:   Review of patient's allergies indicates:  No Known Allergies     Imaging, X-ray:     FINDINGS:  No fracture or dislocation.  No joint effusion.  Cartilage spaces are maintained.     Impression:     Unremarkable examination.    Prior Therapy: none  Social History:  not specified  Occupation: see intake  Prior Level of Function: Running w/o complaint  Current Level of Function: pain with stairs and walking long distances; immediate pain with running.    Pain:  Current 3/10, worst 6/10, best 0/10   Location: { right knee(s)   Description: Aching, Dull, and Sharp  Aggravating Factors: Standing, Walking, Night Time, and Running  Easing Factors: rest    Pts goals: Return to running; normal ADLs    Objective     Observation / Gait: Excessive R hip drop in L stance, but hip drop rebecca    Posture: Mild genu valgum on R    Palpation: Mild TTP lateral joint line    Sensation: WNL    Range of Motion (Passive):   Knee Right  Left    Flexion 135 135   Extension +5 +5     Range of Motion (Active):   Knee Right  Left    Flexion 135 135   Extension +5 +5     Lower Extremity Strength:  Right LE  Left LE    Quadriceps: 4/5 Quadriceps: 5/5   Hamstrings: 4+/5 Hamstrings: 4+/5   Hip  Flexion: 4/5 Hip Flexion: 4/5   Hip Extension:  3+/5 Hip Extension: 3+/5   Hip ER:  4-/5 Hip ER:  4-/5   Hip IR: 4-/5 Hip IR: 4-/5   Hip ABD: 4-/5 Hip ABD: 4-/5   Hip ADD: 4/5 Hip ADD: 4/5     Joint Mobility: WNL     Flexibility:    90/90 Hamstrings: R = - ; L = -    New's test: R = mild + ; L = -   Mulugeta test: R = np ; L = np   Prone knee bend (RF): R = +; L = +    Special Tests:   Right Left   Valgus Stress Test - -   Varus Stress Test - -   Anterior Drawer - -   Posterior Drawer at 30 deg (PLC) / 90 deg (PCL) - -   Dial Test (PLC) - -   Lachman's Test - -   Posterior Sag Test - -   Flakita's Test + discomfort -   Thessaly's Test + discomfort -   Patellar Grind Test - -     Functional tests:   DL squat: mild tightness on anterior knee; quad dom  SL squat: Decreased descent on R with poor dynamic valgus control rebecca    Limitation/Restriction for FOTO Knee Survey    Therapist reviewed FOTO scores for Kailee Messer on 9/26/2022.   FOTO documents entered into [employer]bs[employer] - see Media section.    Limitation Score: 69%         TREATMENT   Treatment Time In: 1530  Treatment Time Out: 1600  Total Treatment time (time-based codes) separate from Evaluation: 30 minutes    Kailee received therapeutic exercises to develop strength, endurance, ROM, flexibility, posture, and core stabilization for 30 minutes including:    Patient education - diagnosis, prognosis, tissue healing timelines, importance of HEP    Prone RF stretch 3x30 sec  SL bridge x15 ea  SLR 10x5 sec ea  Sideplank with clam x15 ea  Wall clam x15 ea    NEXT VISIT - Cont above; SL heel tap and leg press when able    Home Exercises and Patient Education Provided    Education provided:   - see above    Written Home Exercises Provided: yes.  Exercises were reviewed and patient was able to demonstrate them prior to the end of the session.  Patient demonstrated good  understanding of the education provided.     See EMR under Patient Instructions for exercisesprovided  9/26/2022.    Assessment   Kailee is a 27 y.o. female referred to outpatient Physical Therapy with a medical diagnosis of S80.01XA (ICD-10-CM) - Contusion of right knee, initial encounter. Pt presents with pain, + compression / meniscal testing, quad dominant movement patterns with dynamic valgus elizabeth with SL stability, along with LE strength deficits elizabeth of R quad and rebecca proximal hip abd/er/ext. Impairments and tissue healing timeline contributory to pain with weight bearing and preventing desired level of function.    Pt prognosis is Excellent.   Pt will benefit from skilled outpatient Physical Therapy to address the deficits stated above and in the chart below, provide pt/family education, and to maximize pt's level of independence.     Plan of care discussed with patient: Yes  Pt's spiritual, cultural and educational needs considered and patient is agreeable to the plan of care and goals as stated below:     Anticipated Barriers for therapy: BMI    Medical Necessity is demonstrated by the following  History  Co-morbidities and personal factors that may impact the plan of care Co-morbidities:   high BMI    Personal Factors:   no deficits     moderate   Examination  Body Structures and Functions, activity limitations and participation restrictions that may impact the plan of care Body Regions:   lower extremities    Body Systems:    strength  balance  gait  transfers  transitions  motor control  motor learning  edema    Participation Restrictions:   covid-19    Activity limitations:   Learning and applying knowledge  no deficits    General Tasks and Commands  no deficits    Communication  no deficits    Mobility  walking  Running    Self care  no deficits    Domestic Life  no deficits    Interactions/Relationships  no deficits    Life Areas  no deficits    Community and Social Life  no deficits         high   Clinical Presentation stable and uncomplicated low   Decision Making/ Complexity Score: low     GOALS:  Short Term Goals:  0-4 weeks  1.Report decreased R knee pain  < / =  3/10  to increase tolerance for ADLs and running  2. Negative RF testing  3. Increase strength by 1/3 MMT grade in quad  to increase tolerance for ADL and work activities.  4. Pt to tolerate HEP to improve ROM and independence with ADL's    Long Term Goals: 5-8 weeks  1.Report decreased R knee pain < / = 1/10  to increase tolerance for ADLs and running  2.Patient goal: no pain with stairs; return to running w/o complaint  3.Increase strength to >/= 4+/5 in quad and hip musculature  to increase tolerance for ADL and work activities.  4. Pt will report at CJ level (20-40% impaired) on FOTO knee to demonstrate increase in LE function with every day tasks.     Plan   Plan of care Certification: 9/26/2022 to 12/31/2022.    Outpatient Physical Therapy 1 times weekly for 8 weeks to include the following interventions: Manual Therapy, Moist Heat/ Ice, Neuromuscular Re-ed, Patient Education, Self Care, Therapeutic Activities, and Therapeutic Exercise.     JUAN JOSE SOTOMAYOR, PT, DPT, OCS

## 2022-10-07 ENCOUNTER — CLINICAL SUPPORT (OUTPATIENT)
Dept: REHABILITATION | Facility: HOSPITAL | Age: 27
End: 2022-10-07
Payer: COMMERCIAL

## 2022-10-07 DIAGNOSIS — M25.561 RIGHT KNEE PAIN, UNSPECIFIED CHRONICITY: ICD-10-CM

## 2022-10-07 PROCEDURE — 97110 THERAPEUTIC EXERCISES: CPT

## 2022-10-07 NOTE — PROGRESS NOTES
Physical Therapy Daily Treatment Note     Name: Kailee Messer  [facility] Number: [id number]    Therapy Diagnosis: No diagnosis found.  Physician: Jose Rodrigues IV, MD    Visit Date: 10/7/2022  Therapy Diagnosis: No diagnosis found.  Physician: Jose Rodrigues IV, MD     Physician Orders: PT Eval and Treat   Medical Diagnosis from Referral: S80.01XA (ICD-10-CM) - Contusion of right knee, initial encounter  Evaluation Date: 9/26/2022  Authorization Period Expiration: 09/14/2023  Plan of Care Expiration: 12/31/2022  Visit # / Visits authorized: 1/ 12     Time In: 1005  Time Out: 1105  Total Appointment Time (timed & untimed codes): 60 minutes     Precautions: Standard    Subjective     Pt reports: Did not complete HEP consistently due to work activities and time constraints. Did go to the gym once and tried to ride the bike, but R foot went a little numb so halted and this subsided. Discomfort in knee decreasing over time.    She was not compliant with home exercise program.  Response to previous treatment: no changes  Functional change: no changes    Pain: 2/10  Location: right knee      Objective     Kailee received therapeutic exercises to develop strength, endurance, ROM, flexibility, posture, and core stabilization for 60 minutes including:     Patient education:  - diagnosis, prognosis, tissue healing timelines, importance of HEP  - stressed HEP consistency     SL bridge 4 x burn ea  SLR 2lbs 3x10x5 sec R  Sideplank with clam GTB 3x10 ea  Shuttle DL GTB 75# 3x10  Shuttle SL lateral 50# 3x10  Wall clam 3x10 ea  SL hip abd ball on wall 10x10 sec ea  Prone RF stretch 3x30 sec     NEXT VISIT - Cont above; SL heel tap when able      Home Exercises Provided and Patient Education Provided     Education provided:   - see above    Written Home Exercises Provided: Patient instructed to cont prior HEP.  Exercises were reviewed and Kailee was able to demonstrate them prior to the end of the session.  Kailee demonstrated good   understanding of the education provided.     See EMR under Patient Instructions for exercises provided prior visit.    Assessment     Jenn all interventions well and with good challenge within visit.     Numbness during biking outside of clinic likely due to neural tension of peroneal nerve following knee injury.     Demonstrates excessive anterior chain dominance and poor hip strength / stability.     Needs cont focus on hip strengthening elizabeth going forward along with some quad activation training to help with RTR.    Kailee Is progressing well towards her goals.   Pt prognosis is Excellent.     Pt will continue to benefit from skilled outpatient physical therapy to address the deficits listed in the problem list box on initial evaluation, provide pt/family education and to maximize pt's level of independence in the home and community environment.     Pt's spiritual, cultural and educational needs considered and pt agreeable to plan of care and goals.    Anticipated barriers to physical therapy: BMI    GOALS: Short Term Goals:  0-4 weeks  1.Report decreased R knee pain  < / =  3/10  to increase tolerance for ADLs and running  2. Negative RF testing  3. Increase strength by 1/3 MMT grade in quad  to increase tolerance for ADL and work activities.  4. Pt to tolerate HEP to improve ROM and independence with ADL's     Long Term Goals: 5-8 weeks  1.Report decreased R knee pain < / = 1/10  to increase tolerance for ADLs and running  2.Patient goal: no pain with stairs; return to running w/o complaint  3.Increase strength to >/= 4+/5 in quad and hip musculature  to increase tolerance for ADL and work activities.  4. Pt will report at CJ level (20-40% impaired) on FOTO knee to demonstrate increase in LE function with every day tasks.    Plan     See POC in treatment section for evaluation    JUAN JOSE SOTOMAYOR, PT, DPT, OCS

## 2022-10-12 ENCOUNTER — CLINICAL SUPPORT (OUTPATIENT)
Dept: REHABILITATION | Facility: HOSPITAL | Age: 27
End: 2022-10-12
Payer: COMMERCIAL

## 2022-10-12 DIAGNOSIS — M25.561 RIGHT KNEE PAIN, UNSPECIFIED CHRONICITY: Primary | ICD-10-CM

## 2022-10-12 PROCEDURE — 97110 THERAPEUTIC EXERCISES: CPT

## 2022-10-12 PROCEDURE — 97112 NEUROMUSCULAR REEDUCATION: CPT

## 2022-10-12 NOTE — PROGRESS NOTES
Physical Therapy Daily Treatment Note     Name: Kailee Messer  [facility] Number: [id number]    Therapy Diagnosis: No diagnosis found.  Physician: Jose Rodrigues IV, MD    Visit Date: 10/12/2022  Therapy Diagnosis: No diagnosis found.  Physician: Jose Rodrigues IV, MD     Physician Orders: PT Eval and Treat   Medical Diagnosis from Referral: S80.01XA (ICD-10-CM) - Contusion of right knee, initial encounter  Evaluation Date: 9/26/2022  Authorization Period Expiration: 09/14/2023  Plan of Care Expiration: 12/31/2022  Visit # / Visits authorized: 2/ 12      Time In: 0900  Time Out:  1000  Total Appointment Time (timed & untimed codes): 60 minutes     Precautions: Standard    Subjective     Pt reports: Had some knee discomfort when doing bridges and SLR yesterday night. Did not have this previously over the weekend when doing HEP. Today reports no complaints.    Otherwise knee is not bugging her too much.    She was not compliant with home exercise program.  Response to previous treatment: no changes  Functional change: no changes    Pain: 2/10  Location: right knee      Objective     Kailee received therapeutic exercises to develop strength, endurance, ROM, flexibility, posture, and core stabilization for 40 minutes including:     Patient education:  - diagnosis, prognosis, tissue healing timelines, importance of HEP  - stressed HEP consistency    Elliptical x8 min for CV and LE endurance  SL bridge 3 x burn ea  Sideplank with clam GTB 3x10 ea    Knee extension machine: 7.5lbs 3x10 ea - focus on eccentric    Prone RF stretch 4x30 sec  Supine HS across body stretch 4x30 sec      NEXT VISIT - Cont above; shuttle jumps -> plyo impact in two weeks.      Held:  SLR 2lbs 3x10x5 sec R  Shuttle DL GTB 75# 3x10  Shuttle SL lateral 50# 3x10  Wall clam 3x10 ea  SL hip abd ball on wall 10x10 sec ea      Kailee participated in neuromuscular re-education activities to improve: Balance, Coordination, Kinesthetic, Sense, Proprioception,  and Posture for 20 minutes. The following activities were included:    Greenlandic split squat 4x10 ea  SL lateral heel tap 3 in 3x10 ea      Home Exercises Provided and Patient Education Provided     Education provided:   - see above    Written Home Exercises Provided: Patient instructed to cont prior HEP.  Exercises were reviewed and Kailee was able to demonstrate them prior to the end of the session.  Kailee demonstrated good  understanding of the education provided.     See EMR under Patient Instructions for exercises provided prior visit.    Assessment     Noted neural tension of peroneal along with quad flexibility deficits contributory to discomfort with HEP yesterday. Had some pain with SL stability activities at beginning of visit, but reduced with dynamic stretching on Greenlandic split squats.    Good ayah to all strengthening activities and reducing pain overall. Plan to progress to shuttle jumps at next visit to assess impact ayah.    Needs cont focus on hip strengthening elizabeth going forward along with some quad activation training to help with RTR.    Kailee Is progressing well towards her goals.   Pt prognosis is Excellent.     Pt will continue to benefit from skilled outpatient physical therapy to address the deficits listed in the problem list box on initial evaluation, provide pt/family education and to maximize pt's level of independence in the home and community environment.     Pt's spiritual, cultural and educational needs considered and pt agreeable to plan of care and goals.    Anticipated barriers to physical therapy: BMI    GOALS: Short Term Goals:  0-4 weeks  1.Report decreased R knee pain  < / =  3/10  to increase tolerance for ADLs and running  2. Negative RF testing  3. Increase strength by 1/3 MMT grade in quad  to increase tolerance for ADL and work activities.  4. Pt to tolerate HEP to improve ROM and independence with ADL's     Long Term Goals: 5-8 weeks  1.Report decreased R knee pain < /  = 1/10  to increase tolerance for ADLs and running  2.Patient goal: no pain with stairs; return to running w/o complaint  3.Increase strength to >/= 4+/5 in quad and hip musculature  to increase tolerance for ADL and work activities.  4. Pt will report at CJ level (20-40% impaired) on FOTO knee to demonstrate increase in LE function with every day tasks.    Plan     See POC in treatment section for evaluation    JUAN JOSE SOTOMAYOR, PT, DPT, OCS

## 2022-10-21 ENCOUNTER — CLINICAL SUPPORT (OUTPATIENT)
Dept: REHABILITATION | Facility: HOSPITAL | Age: 27
End: 2022-10-21
Payer: COMMERCIAL

## 2022-10-21 DIAGNOSIS — M25.561 RIGHT KNEE PAIN, UNSPECIFIED CHRONICITY: Primary | ICD-10-CM

## 2022-10-21 PROCEDURE — 97110 THERAPEUTIC EXERCISES: CPT

## 2022-10-21 PROCEDURE — 97530 THERAPEUTIC ACTIVITIES: CPT

## 2022-10-21 NOTE — PROGRESS NOTES
Physical Therapy Daily Treatment Note     Name: Kailee Messer  [facility] Number: [id number]    Therapy Diagnosis: No diagnosis found.  Physician: Jose Rodrigues IV, MD    Visit Date: 10/21/2022  Therapy Diagnosis: No diagnosis found.  Physician: Jose Rodrigues IV, MD     Physician Orders: PT Eval and Treat   Medical Diagnosis from Referral: S80.01XA (ICD-10-CM) - Contusion of right knee, initial encounter  Evaluation Date: 9/26/2022  Authorization Period Expiration: 09/14/2023  Plan of Care Expiration: 12/31/2022  Visit # / Visits authorized: 3/ 12       Time In: 0900   Time Out: 0955  Total Appointment Time (timed & untimed codes): 55 minutes     Precautions: Standard    Subjective     Pt reports: No discomfort with HEP reported. Did try to run since last visit. Did 3.5 min before knee started to bother her; an improvement. Did not linger following this.    She was not compliant with home exercise program.  Response to previous treatment: no changes  Functional change: no changes    Pain: 2/10  Location: right knee      Objective     Kailee received therapeutic exercises to develop strength, endurance, ROM, flexibility, posture, and core stabilization for 45 minutes including:     Patient education:  - diagnosis, prognosis, tissue healing timelines, importance of HEP  - stressed HEP consistency  - hold on running with pain    Elliptical x8 min Lvl 3 for CV and LE endurance  ASLR 2lbs 3x10x2 sec R  SL bridge 4 x burn ea  Hip Thrust SL 3x10 ea  Hip Thrust DL 20lbs 3x10       NEXT VISIT -  plyo impact / agility      Held:  Sideplank with clam GTB 3x10 ea  Knee extension machine: 7.5lbs 3x10 ea - focus on eccentric  Prone RF stretch 4x30 sec  Supine HS across body stretch 4x30 sec  Shuttle DL GTB 75# 3x10  Shuttle SL lateral 50# 3x10  Wall clam 3x10 ea  SL hip abd ball on wall 10x10 sec ea      Kailee participated in neuromuscular re-education activities to improve: Balance, Coordination, Kinesthetic, Sense,  Proprioception, and Posture for 00 minutes. The following activities were included:    Held:  Qatari split squat 4x10 ea  SL lateral heel tap 3 in 3x10 ea    Kailee participated in dynamic functional therapeutic activities to improve functional performance for 10 minutes, including:    Education on form / active landing mechanics    Shuttle jumps 1R:  DL landings x3 min  Prancing landings x3 min  SL R landings x3 min      Home Exercises Provided and Patient Education Provided     Education provided:   - see above    Written Home Exercises Provided: Patient instructed to cont prior HEP.  Exercises were reviewed and Kailee was able to demonstrate them prior to the end of the session.  Kailee demonstrated good  understanding of the education provided.     See EMR under Patient Instructions for exercises provided prior visit.    Assessment     Advised to hold on running secondary to pain with this activity - verbal understanding.    Able to ayah shuttle jumps well within visit and with good eccentric active control and no increase in pain. Good ayah to all strengthening activities and reducing pain overall. Plan to progress to plyometrics and agility ladder to assess impact ayah further.     Needs cont focus on hip strengthening elizabeth going forward along with some quad activation training to help with RTR.    Kailee Is progressing well towards her goals.   Pt prognosis is Excellent.     Pt will continue to benefit from skilled outpatient physical therapy to address the deficits listed in the problem list box on initial evaluation, provide pt/family education and to maximize pt's level of independence in the home and community environment.     Pt's spiritual, cultural and educational needs considered and pt agreeable to plan of care and goals.    Anticipated barriers to physical therapy: BMI    GOALS: Short Term Goals:  0-4 weeks  1.Report decreased R knee pain  < / =  3/10  to increase tolerance for ADLs and running  2.  Negative RF testing  3. Increase strength by 1/3 MMT grade in quad  to increase tolerance for ADL and work activities.  4. Pt to tolerate HEP to improve ROM and independence with ADL's     Long Term Goals: 5-8 weeks  1.Report decreased R knee pain < / = 1/10  to increase tolerance for ADLs and running  2.Patient goal: no pain with stairs; return to running w/o complaint  3.Increase strength to >/= 4+/5 in quad and hip musculature  to increase tolerance for ADL and work activities.  4. Pt will report at CJ level (20-40% impaired) on FOTO knee to demonstrate increase in LE function with every day tasks.    Plan     See POC in treatment section for evaluation    JUAN JOSE SOTOMAYOR, PT, DPT, OCS

## 2022-10-28 ENCOUNTER — CLINICAL SUPPORT (OUTPATIENT)
Dept: REHABILITATION | Facility: HOSPITAL | Age: 27
End: 2022-10-28
Payer: COMMERCIAL

## 2022-10-28 DIAGNOSIS — M25.561 RIGHT KNEE PAIN, UNSPECIFIED CHRONICITY: Primary | ICD-10-CM

## 2022-10-28 PROCEDURE — 97110 THERAPEUTIC EXERCISES: CPT

## 2022-10-28 PROCEDURE — 97530 THERAPEUTIC ACTIVITIES: CPT

## 2022-10-28 NOTE — PROGRESS NOTES
Physical Therapy Daily Treatment Note     Name: Kailee Messer  [facility] Number: [id number]    Therapy Diagnosis: No diagnosis found.  Physician: Jose Rodrigues IV, MD    Visit Date: 10/28/2022  Therapy Diagnosis: No diagnosis found.  Physician: Jose Rodrigues IV, MD     Physician Orders: PT Eval and Treat   Medical Diagnosis from Referral: S80.01XA (ICD-10-CM) - Contusion of right knee, initial encounter  Evaluation Date: 9/26/2022  Authorization Period Expiration: 09/14/2023  Plan of Care Expiration: 12/31/2022  Visit # / Visits authorized: 4/ 12       Time In: 0900   Time Out: 1010  Total Appointment Time (timed & untimed codes): 70 minutes - 10 min ice     Precautions: Standard    Subjective     Pt reports: No discomfort with walking at all anymore. No complaints from loading last visit as well.    FOTO IE - 69%  FOTO 10/28/2022 - 73%  FOTO Goal - 80%    She was not compliant with home exercise program.  Response to previous treatment: no changes  Functional change: decreasing discomfort with ADLs.    Pain: 0/10  Location: right knee      Objective     Kailee received therapeutic exercises to develop strength, endurance, ROM, flexibility, posture, and core stabilization for 35 minutes including:     Patient education:  - diagnosis, prognosis, tissue healing timelines, importance of HEP  - stressed HEP consistency  - hold on running with pain    Elliptical x8 min Lvl 3 for CV and LE endurance  SL bridge 3 x burn ea  LAQ 3# 10x10 sec ea  Clam hold GTB 15x10 sec ea    Dynamic stretching x1 lap ea:  Quad pull  Frankenstein  Hip opener  Hip closer      NEXT VISIT -  plyo impact / agility      Held:  ASLR 2lbs 3x10x2 sec R  Hip Thrust SL 3x10 ea  Hip Thrust DL 20lbs 3x10   Sideplank with clam GTB 3x10 ea  Knee extension machine: 7.5lbs 3x10 ea - focus on eccentric  Prone RF stretch 4x30 sec  Supine HS across body stretch 4x30 sec  Shuttle DL GTB 75# 3x10  Shuttle SL lateral 50# 3x10  Wall clam 3x10 ea  SL hip abd ball on  wall 10x10 sec ea      Kailee participated in neuromuscular re-education activities to improve: Balance, Coordination, Kinesthetic, Sense, Proprioception, and Posture for 00 minutes. The following activities were included:    Held:  Czech split squat 4x10 ea  SL lateral heel tap 3 in 3x10 ea    Kailee participated in dynamic functional therapeutic activities to improve functional performance for 25 minutes, including:    Program    Exercise  Sets Foot Contacts Per Set Total Contacts   Double leg hops in place 3 30 90   Double leg hops forward/backward 3 30 90   Double leg hops side to side  3 30 90   Single leg hops in place  3 20 60   Single leg hops forward/backward 3 20 60   Single leg hops side to side 3 20 60   Single leg forward hops  4 5 20     Bold completed within visit; 30 sec rest between sets and exercises    Held:  Education on form / active landing mechanics  Shuttle jumps 1R:  DL landings x3 min  Prancing landings x3 min  SL R landings x3 min    ICE x10 min post visit    Home Exercises Provided and Patient Education Provided     Education provided:   - see above    Written Home Exercises Provided: Patient instructed to cont prior HEP.  Exercises were reviewed and Kailee was able to demonstrate them prior to the end of the session.  Kailee demonstrated good  understanding of the education provided.     See EMR under Patient Instructions for exercises provided prior visit.    Assessment     Improving function due to improving strength overall and tissue healing timelines.    Patient ayah all interventions well until plyometric SL fwd/bwd landings. Halted this activity to prevent aggravation of knee soreness. Not currently ready for return to running protocol secondary to poor impact ayah.     Needs cont focus on hip strengthening elizabeth going forward along with some quad activation training to help with RTR.    Kailee Is progressing well towards her goals.   Pt prognosis is Excellent.     Pt will  continue to benefit from skilled outpatient physical therapy to address the deficits listed in the problem list box on initial evaluation, provide pt/family education and to maximize pt's level of independence in the home and community environment.     Pt's spiritual, cultural and educational needs considered and pt agreeable to plan of care and goals.    Anticipated barriers to physical therapy: BMI    GOALS: Short Term Goals:  0-4 weeks  1.Report decreased R knee pain  < / =  3/10  to increase tolerance for ADLs and running  2. Negative RF testing  3. Increase strength by 1/3 MMT grade in quad  to increase tolerance for ADL and work activities.  4. Pt to tolerate HEP to improve ROM and independence with ADL's     Long Term Goals: 5-8 weeks  1.Report decreased R knee pain < / = 1/10  to increase tolerance for ADLs and running  2.Patient goal: no pain with stairs; return to running w/o complaint  3.Increase strength to >/= 4+/5 in quad and hip musculature  to increase tolerance for ADL and work activities.  4. Pt will report at CJ level (20-40% impaired) on FOTO knee to demonstrate increase in LE function with every day tasks.    Plan     See POC in treatment section for evaluation    JUAN JOSE SOTOMAYOR, PT, DPT, OCS

## 2022-11-04 ENCOUNTER — CLINICAL SUPPORT (OUTPATIENT)
Dept: REHABILITATION | Facility: HOSPITAL | Age: 27
End: 2022-11-04
Payer: COMMERCIAL

## 2022-11-04 DIAGNOSIS — M25.561 RIGHT KNEE PAIN, UNSPECIFIED CHRONICITY: Primary | ICD-10-CM

## 2022-11-04 PROCEDURE — 97110 THERAPEUTIC EXERCISES: CPT

## 2022-11-04 NOTE — PROGRESS NOTES
Physical Therapy Daily Treatment Note     Name: Kailee Messer  [facility] Number: [id number]    Therapy Diagnosis: No diagnosis found.  Physician: Jose Rodrigues IV, MD    Visit Date: 11/4/2022  Therapy Diagnosis: No diagnosis found.  Physician: Jose Rodrigues IV, MD     Physician Orders: PT Eval and Treat   Medical Diagnosis from Referral: S80.01XA (ICD-10-CM) - Contusion of right knee, initial encounter  Evaluation Date: 9/26/2022  Authorization Period Expiration: 09/14/2023  Plan of Care Expiration: 12/31/2022  Visit # / Visits authorized: 4/ 12  ***     Time In: 0900 ***  Time Out: 1010 ***  Total Appointment Time (timed & untimed codes): 70 minutes - 10 min ice     Precautions: Standard    Subjective     Pt reports: *** No discomfort with walking at all anymore. No complaints from loading last visit as well.    FOTO IE - 69%  FOTO 10/28/2022 - 73%  FOTO Goal - 80%    She was not compliant with home exercise program.  Response to previous treatment: no changes  Functional change: decreasing discomfort with ADLs.    Pain: 0/10  Location: right knee      Objective     Kailee received therapeutic exercises to develop strength, endurance, ROM, flexibility, posture, and core stabilization for 35 minutes including:     Patient education:  - diagnosis, prognosis, tissue healing timelines, importance of HEP  - stressed HEP consistency  - hold on running with pain    Elliptical x8 min Lvl 3 for CV and LE endurance  SL bridge 3 x burn ea  LAQ 3# 10x10 sec ea  Clam hold GTB 15x10 sec ea    Dynamic stretching x1 lap ea:  Quad pull  Frankenstein  Hip opener  Hip closer      NEXT VISIT -  plyo impact / agility      Held:  ASLR 2lbs 3x10x2 sec R  Hip Thrust SL 3x10 ea  Hip Thrust DL 20lbs 3x10   Sideplank with clam GTB 3x10 ea  Knee extension machine: 7.5lbs 3x10 ea - focus on eccentric  Prone RF stretch 4x30 sec  Supine HS across body stretch 4x30 sec  Shuttle DL GTB 75# 3x10  Shuttle SL lateral 50# 3x10  Wall clam 3x10 ea  SL hip  abd ball on wall 10x10 sec ea      Kailee participated in neuromuscular re-education activities to improve: Balance, Coordination, Kinesthetic, Sense, Proprioception, and Posture for 00 minutes. The following activities were included:    Held:  Romansh split squat 4x10 ea  SL lateral heel tap 3 in 3x10 ea    Kailee participated in dynamic functional therapeutic activities to improve functional performance for 25 minutes, including:    Program    Exercise  Sets Foot Contacts Per Set Total Contacts   Double leg hops in place 3 30 90   Double leg hops forward/backward 3 30 90   Double leg hops side to side  3 30 90   Single leg hops in place  3 20 60   Single leg hops forward/backward 3 20 60   Single leg hops side to side 3 20 60   Single leg forward hops  4 5 20     Bold completed within visit; 30 sec rest between sets and exercises    Held:  Education on form / active landing mechanics  Shuttle jumps 1R:  DL landings x3 min  Prancing landings x3 min  SL R landings x3 min    ICE x10 min post visit    Home Exercises Provided and Patient Education Provided     Education provided:   - see above    Written Home Exercises Provided: Patient instructed to cont prior HEP.  Exercises were reviewed and Kailee was able to demonstrate them prior to the end of the session.  Kailee demonstrated good  understanding of the education provided.     See EMR under Patient Instructions for exercises provided prior visit.    Assessment     ***    Improving function due to improving strength overall and tissue healing timelines.    Patient ayah all interventions well until plyometric SL fwd/bwd landings. Halted this activity to prevent aggravation of knee soreness. Not currently ready for return to running protocol secondary to poor impact ayah.     Needs cont focus on hip strengthening elizabeth going forward along with some quad activation training to help with RTR.    Kailee Is progressing well towards her goals.   Pt prognosis is Excellent.      Pt will continue to benefit from skilled outpatient physical therapy to address the deficits listed in the problem list box on initial evaluation, provide pt/family education and to maximize pt's level of independence in the home and community environment.     Pt's spiritual, cultural and educational needs considered and pt agreeable to plan of care and goals.    Anticipated barriers to physical therapy: BMI    GOALS: Short Term Goals:  0-4 weeks  1.Report decreased R knee pain  < / =  3/10  to increase tolerance for ADLs and running  2. Negative RF testing  3. Increase strength by 1/3 MMT grade in quad  to increase tolerance for ADL and work activities.  4. Pt to tolerate HEP to improve ROM and independence with ADL's     Long Term Goals: 5-8 weeks  1.Report decreased R knee pain < / = 1/10  to increase tolerance for ADLs and running  2.Patient goal: no pain with stairs; return to running w/o complaint  3.Increase strength to >/= 4+/5 in quad and hip musculature  to increase tolerance for ADL and work activities.  4. Pt will report at CJ level (20-40% impaired) on FOTO knee to demonstrate increase in LE function with every day tasks.    Plan     See POC in treatment section for evaluation    JUAN JOSE SOTOMAYOR, PT, DPT, OCS

## 2022-11-04 NOTE — PROGRESS NOTES
"  Physical Therapy Daily Treatment Note     Name: Kailee [facility] Number: [id number]    Therapy Diagnosis:   Encounter Diagnosis   Name Primary?    Right knee pain, unspecified chronicity Yes     Physician: Jose Rodrigues IV, MD    Visit Date: 11/4/2022  Therapy Diagnosis: No diagnosis found.  Physician: Jose Rodrigues IV, MD     Physician Orders: PT Eval and Treat   Medical Diagnosis from Referral: S80.01XA (ICD-10-CM) - Contusion of right knee, initial encounter  Evaluation Date: 9/26/2022  Authorization Period Expiration: 09/14/2023  Plan of Care Expiration: 12/31/2022   Visit # / Visits authorized:  5/12     Time In: 0903   Time Out:  10:00  Total Appointment Time (timed & untimed codes): 57 min     Precautions: Standard    Subjective     Pt reports: She had pain for the rest of the day after last session. Did not keep up with HEP this week but did not have any pain after last Friday.     FOTO IE - 69%  FOTO 10/28/2022 - 73%  FOTO Goal - 80%    She was not compliant with home exercise program.  Response to previous treatment: no changes  Functional change: decreasing discomfort with ADLs.    Pain: 2/10  Location: right knee      Objective     Kailee received therapeutic exercises to develop strength, endurance, ROM, flexibility, posture, and core stabilization for 57 minutes including:     Patient education:  - diagnosis, prognosis, tissue healing timelines, importance of HEP  - stressed HEP consistency  - hold on running with pain    Elliptical x8 min Lvl 3 for CV and LE endurance  SL bridge 3 x burn ea  Lateral walk 2x50ft GTB at ankles  SLSD lateral 5" step 3x10 B  Hip clock 3 way GTB 3x5  SL squat to 24" box 3x8 B    NP  Dynamic stretching x1 lap ea:  Quad pull  Frankenstein  Hip opener  Hip closer      NEXT VISIT -  plyo impact / agility      Held:  ASLR 2lbs 3x10x2 sec R  LAQ 3# 10x10 sec ea  Clam hold GTB 15x10 sec ea  Hip Thrust SL 3x10 ea  Hip Thrust DL 20lbs 3x10   Sideplank with clam GTB 3x10 " "ea  Knee extension machine: 7.5lbs 3x10 ea - focus on eccentric  Prone RF stretch 4x30 sec  Supine HS across body stretch 4x30 sec  Shuttle DL GTB 75# 3x10  Shuttle SL lateral 50# 3x10  Wall clam 3x10 ea  SL hip abd ball on wall 10x10 sec ea      Kailee participated in neuromuscular re-education activities to improve: Balance, Coordination, Kinesthetic, Sense, Proprioception, and Posture for 00 minutes. The following activities were included:    Held:  Belizean split squat 4x10 ea  SL lateral heel tap 3 in 3x10 ea    Kailee participated in dynamic functional therapeutic activities to improve functional performance for 00 minutes, including:    NP: will reassess in following sessions as appropriate to determine readiness for return to run.     Program    Exercise  Sets Foot Contacts Per Set Total Contacts   Double leg hops in place 3 30 90   Double leg hops forward/backward 3 30 90   Double leg hops side to side  3 30 90   Single leg hops in place  3 20 60   Single leg hops forward/backward 3 20 60   Single leg hops side to side 3 20 60   Single leg forward hops  4 5 20         Held:  Education on form / active landing mechanics  Shuttle jumps 1R:  DL landings x3 min  Prancing landings x3 min  SL R landings x3 min    ICE x10 min post visit    Home Exercises Provided and Patient Education Provided     Education provided:   - see above    Written Home Exercises Provided: Patient instructed to cont prior HEP.  Exercises were reviewed and Kailee was able to demonstrate them prior to the end of the session.  Kailee demonstrated good  understanding of the education provided.     See EMR under Patient Instructions for exercises provided prior visit.    Assessment     Decreased SL stability noted with increased R>L knee valgus with lateral step down from 5" step. Pt notes 2/10 pain initially in R knee which improves with repetition and cuing for glut med engagement. Held on plyometric work in today's session 2/2 to " significant decreased control eccentrically with SL work on R LE at this time.     Kailee Is progressing well towards her goals.   Pt prognosis is Excellent.     Pt will continue to benefit from skilled outpatient physical therapy to address the deficits listed in the problem list box on initial evaluation, provide pt/family education and to maximize pt's level of independence in the home and community environment.     Pt's spiritual, cultural and educational needs considered and pt agreeable to plan of care and goals.    Anticipated barriers to physical therapy: BMI    GOALS: Short Term Goals:  0-4 weeks  1.Report decreased R knee pain  < / =  3/10  to increase tolerance for ADLs and running  2. Negative RF testing  3. Increase strength by 1/3 MMT grade in quad  to increase tolerance for ADL and work activities.  4. Pt to tolerate HEP to improve ROM and independence with ADL's     Long Term Goals: 5-8 weeks  1.Report decreased R knee pain < / = 1/10  to increase tolerance for ADLs and running  2.Patient goal: no pain with stairs; return to running w/o complaint  3.Increase strength to >/= 4+/5 in quad and hip musculature  to increase tolerance for ADL and work activities.  4. Pt will report at CJ level (20-40% impaired) on FOTO knee to demonstrate increase in LE function with every day tasks.    Plan     See POC in treatment section for evaluation    Rusty Astudillo, PT, DPT

## 2022-11-09 ENCOUNTER — CLINICAL SUPPORT (OUTPATIENT)
Dept: REHABILITATION | Facility: HOSPITAL | Age: 27
End: 2022-11-09
Payer: COMMERCIAL

## 2022-11-09 DIAGNOSIS — M25.561 RIGHT KNEE PAIN, UNSPECIFIED CHRONICITY: Primary | ICD-10-CM

## 2022-11-09 PROCEDURE — 97110 THERAPEUTIC EXERCISES: CPT

## 2022-11-09 NOTE — PROGRESS NOTES
"  Physical Therapy Daily Treatment Note     Name: Kailee Messer  [facility] Number: [id number]    Therapy Diagnosis:   No diagnosis found.    Physician: Jose Rodrigues IV, MD    Visit Date: 11/9/2022  Therapy Diagnosis: No diagnosis found.  Physician: Jose Rodrigues IV, MD     Physician Orders: PT Eval and Treat   Medical Diagnosis from Referral: S80.01XA (ICD-10-CM) - Contusion of right knee, initial encounter  Evaluation Date: 9/26/2022  Authorization Period Expiration: 09/14/2023  Plan of Care Expiration: 12/31/2022   Visit # / Visits authorized:  6/12      Time In: 0905  Time Out:  1000   Total Appointment Time (timed & untimed codes): 55 min     Precautions: Standard    Subjective     Pt reports:  Able to walk 10k steps w/o pain over the course of a day. Knee was a little tired yesterday after biking and gym activities. Has race this weekend, but not planning on running.     FOTO IE - 69%  FOTO 10/28/2022 - 73%  FOTO Goal - 80%    She was not compliant with home exercise program.  Response to previous treatment: no changes  Functional change: decreasing discomfort with ADLs.    Pain: 2/10  Location: right knee      Objective     Kailee received therapeutic exercises to develop strength, endurance, ROM, flexibility, posture, and core stabilization for 55 minutes including:     Patient education:  - diagnosis, prognosis, tissue healing timelines, importance of HEP  - stressed HEP consistency  - hold on running with pain  - RTR plyos after race     Elliptical x8 min Lvl 4 for CV and LE endurance  BTB clam hold 15x10 sec ea  BTB bridge 15x10 sec  Adduction 10lbs 15x10 sec  Quad set 10x10 sec  ASLR 3x10x5 sec   LAQ - not ayah  Quad stretch prone 5x30 sec  HS stretch across 5x30 sec    NEXT VISIT -  plyo impact / agility      Held:  SL bridge 3 x burn ea  Lateral walk 2x50ft GTB at ankles  SLSD lateral 5" step 3x10 B  Hip clock 3 way GTB 3x5  SL squat to 24" box 3x8 B  Hip Thrust SL 3x10 ea  Hip Thrust DL 20lbs 3x10 "   Sideplank with clam GTB 3x10 ea  Knee extension machine: 7.5lbs 3x10 ea - focus on eccentric  Prone RF stretch 4x30 sec  Supine HS across body stretch 4x30 sec  Shuttle DL GTB 75# 3x10  Shuttle SL lateral 50# 3x10  Wall clam 3x10 ea  SL hip abd ball on wall 10x10 sec ea      Kailee participated in neuromuscular re-education activities to improve: Balance, Coordination, Kinesthetic, Sense, Proprioception, and Posture for 00 minutes. The following activities were included:    Held:  Slovenian split squat 4x10 ea  SL lateral heel tap 3 in 3x10 ea    Kailee participated in dynamic functional therapeutic activities to improve functional performance for 00 minutes, including:    NP: will reassess in following sessions as appropriate to determine readiness for return to run.      Program    Exercise  Sets Foot Contacts Per Set Total Contacts   Double leg hops in place 3 30 90   Double leg hops forward/backward 3 30 90   Double leg hops side to side  3 30 90   Single leg hops in place  3 20 60   Single leg hops forward/backward 3 20 60   Single leg hops side to side 3 20 60   Single leg forward hops  4 5 20       Held:  Education on form / active landing mechanics  Shuttle jumps 1R:  DL landings x3 min  Prancing landings x3 min  SL R landings x3 min      Home Exercises Provided and Patient Education Provided     Education provided:   - see above    Written Home Exercises Provided: Patient instructed to cont prior HEP.  Exercises were reviewed and Kailee was able to demonstrate them prior to the end of the session.  Kailee demonstrated good  understanding of the education provided.     See EMR under Patient Instructions for exercises provided prior visit.    Assessment     Reduced intensity of activities as patient will be at minimum walking race this weekend and had poor reaction to plyometric assessment when last attempted.    Jenn all activities well, but had discomfort with LAQ so held. PFJ discomfort with LAQ secondary  to muscular tightness as pain reduced following stretching at end of visit.    Upon return from race plan to restart RTR progressions and plyometrics per ayah.    Kailee Is progressing well towards her goals.   Pt prognosis is Excellent.     Pt will continue to benefit from skilled outpatient physical therapy to address the deficits listed in the problem list box on initial evaluation, provide pt/family education and to maximize pt's level of independence in the home and community environment.     Pt's spiritual, cultural and educational needs considered and pt agreeable to plan of care and goals.    Anticipated barriers to physical therapy: BMI    GOALS: Short Term Goals:  0-4 weeks  1.Report decreased R knee pain  < / =  3/10  to increase tolerance for ADLs and running  2. Negative RF testing  3. Increase strength by 1/3 MMT grade in quad  to increase tolerance for ADL and work activities.  4. Pt to tolerate HEP to improve ROM and independence with ADL's     Long Term Goals: 5-8 weeks  1.Report decreased R knee pain < / = 1/10  to increase tolerance for ADLs and running  2.Patient goal: no pain with stairs; return to running w/o complaint  3.Increase strength to >/= 4+/5 in quad and hip musculature  to increase tolerance for ADL and work activities.  4. Pt will report at CJ level (20-40% impaired) on FOTO knee to demonstrate increase in LE function with every day tasks.    Plan     See POC in treatment section for evaluation    JUAN JOSE SOTOMAYOR, PT, DPT, OCS

## 2022-11-25 ENCOUNTER — CLINICAL SUPPORT (OUTPATIENT)
Dept: REHABILITATION | Facility: HOSPITAL | Age: 27
End: 2022-11-25
Payer: COMMERCIAL

## 2022-11-25 DIAGNOSIS — M25.561 RIGHT KNEE PAIN, UNSPECIFIED CHRONICITY: Primary | ICD-10-CM

## 2022-11-25 PROCEDURE — 97110 THERAPEUTIC EXERCISES: CPT

## 2022-11-25 PROCEDURE — 97530 THERAPEUTIC ACTIVITIES: CPT

## 2022-11-25 NOTE — PROGRESS NOTES
Physical Therapy Daily Treatment Note     Name: Kailee Messer  [facility] Number: [id number]    Therapy Diagnosis:   Encounter Diagnosis   Name Primary?    Right knee pain, unspecified chronicity Yes       Physician: Jose Rodrigues IV, MD    Visit Date: 11/25/2022  Therapy Diagnosis: No diagnosis found.  Physician: Jose Rodrigues IV, MD     Physician Orders: PT Eval and Treat   Medical Diagnosis from Referral: S80.01XA (ICD-10-CM) - Contusion of right knee, initial encounter  Evaluation Date: 9/26/2022  Authorization Period Expiration: 09/14/2023  Plan of Care Expiration: 12/31/2022   Visit # / Visits authorized:  7/12      Time In: 1245  Time Out: 1400  Total Appointment Time (timed & untimed codes): 75 min     Precautions: Standard    Subjective     Pt reports: Was able to walk and do a little bit of jogging on her 12k. She did roll her ankle prior to the run a few days beforehand.     She reports overall knee has been doing well, but had pain at [employer] game (stairs / standing) in her knee; 7-8/10 at the game reduced with ice.    FOTO IE - 69%  FOTO 10/28/2022 - 73%  FOTO Goal - 80%    She was not compliant with home exercise program.  Response to previous treatment: no changes  Functional change: decreasing discomfort with ADLs.    Pain: 2/10  Location: right knee      Objective     - talar tilt / anterior drawer on L ankle  + New on R: - on L  + RF  + HS  + reduced discomfort with cuing for active loading.    Kailee received therapeutic exercises to develop strength, endurance, ROM, flexibility, posture, and core stabilization for 40 minutes including:     Patient education:  - diagnosis, prognosis, tissue healing timelines, importance of HEP  - stressed HEP consistency    Elliptical x10 min Lvl 3 for CV and LE endurance  Quad prone 4x30 sec  HS across body 4x30 sec  Stair step up bwd 3x10 - ACTIVE  Stair heel tap fwd 6 in 3x10 - ACTIVE      NEXT VISIT -  plyo impact; A/B skips; clearance for RTR      Kailee  participated in neuromuscular re-education activities to improve: Balance, Coordination, Kinesthetic, Sense, Proprioception, and Posture for 00 minutes. The following activities were included:    Held:  Macedonian split squat 4x10 ea  SL lateral heel tap 3 in 3x10 ea    Kailee participated in dynamic functional therapeutic activities to improve functional performance for 35 minutes, including:    Light jog / skip x1 lap ea - pain    Agility ladder x4 laps ea:   Two feet in fwd  Two feet in lateral  Two feet in / 1 out fwd    Program    Exercise  Sets Foot Contacts Per Set Total Contacts   Double leg hops in place 3 30 90   Double leg hops forward/backward 3 30 90   Double leg hops side to side  3 30 90   Single leg hops in place  3 20 60   Single leg hops forward/backward 3 20 60   Single leg hops side to side 3 20 60   Single leg forward hops  4 5 20   BOLD = Performed    Held:  Shuttle jumps 1R:  DL landings x3 min  Prancing landings x3 min  SL R landings x3 min      Home Exercises Provided and Patient Education Provided     Education provided:   - see above    Written Home Exercises Provided: Patient instructed to cont prior HEP.  Exercises were reviewed and Kailee was able to demonstrate them prior to the end of the session.  Kailee demonstrated good  understanding of the education provided.     See EMR under Patient Instructions for exercises provided prior visit.    Assessment     Unable to clear for RTR secondary to L ankle sprain that occurred ~2 weeks ago. S/S of Grade I-II sprain.    Patient does demonstrate some s/s of PFJ irritation that was reduced following stretching of quad and HS, but especially with cuing for active loading mechanics on stair ascent and descent.    Plyo loading and agility ladder ayah well without R knee pain.    Stressed need for consistency with HEP for cont strength maintenance. Advised patient to continue HEP elizabeth with active loading mechanics and will reassess ayah again for RTR  in ~ 2 weeks as L ankle sprain fully recovers.       Kailee Is progressing well towards her goals.   Pt prognosis is Excellent.     Pt will continue to benefit from skilled outpatient physical therapy to address the deficits listed in the problem list box on initial evaluation, provide pt/family education and to maximize pt's level of independence in the home and community environment.     Pt's spiritual, cultural and educational needs considered and pt agreeable to plan of care and goals.    Anticipated barriers to physical therapy: BMI    GOALS: Short Term Goals:  0-4 weeks  1.Report decreased R knee pain  < / =  3/10  to increase tolerance for ADLs and running  2. Negative RF testing  3. Increase strength by 1/3 MMT grade in quad  to increase tolerance for ADL and work activities.  4. Pt to tolerate HEP to improve ROM and independence with ADL's     Long Term Goals: 5-8 weeks  1.Report decreased R knee pain < / = 1/10  to increase tolerance for ADLs and running  2.Patient goal: no pain with stairs; return to running w/o complaint  3.Increase strength to >/= 4+/5 in quad and hip musculature  to increase tolerance for ADL and work activities.  4. Pt will report at CJ level (20-40% impaired) on FOTO knee to demonstrate increase in LE function with every day tasks.    Plan     See POC in treatment section for evaluation    JUAN JOSE SOTOMAYOR, PT, DPT, OCS

## 2022-12-09 ENCOUNTER — CLINICAL SUPPORT (OUTPATIENT)
Dept: REHABILITATION | Facility: HOSPITAL | Age: 27
End: 2022-12-09
Payer: COMMERCIAL

## 2022-12-09 DIAGNOSIS — M25.561 RIGHT KNEE PAIN, UNSPECIFIED CHRONICITY: Primary | ICD-10-CM

## 2022-12-09 PROCEDURE — 97110 THERAPEUTIC EXERCISES: CPT

## 2022-12-09 PROCEDURE — 97530 THERAPEUTIC ACTIVITIES: CPT

## 2022-12-09 NOTE — PROGRESS NOTES
Physical Therapy Daily Treatment Note     Name: Kailee Messer  [facility] Number: [id number]    Therapy Diagnosis:   Encounter Diagnosis   Name Primary?    Right knee pain, unspecified chronicity Yes       Physician: Jose Rodrigues IV, MD    Visit Date: 12/9/2022  Therapy Diagnosis: No diagnosis found.  Physician: Jose Rodrigues IV, MD     Physician Orders: PT Eval and Treat   Medical Diagnosis from Referral: S80.01XA (ICD-10-CM) - Contusion of right knee, initial encounter  Evaluation Date: 9/26/2022  Authorization Period Expiration: 09/14/2023  Plan of Care Expiration: 12/31/2022   Visit # / Visits authorized:  8/12      Time In: 0900  Time Out: 1000  Total Appointment Time (timed & untimed codes): 60 min     Precautions: Standard    Subjective     Pt reports: No complaints in ankle and knee at current time. Has been inconsistent with HEP, but feels near ready for return to running protocol.    FOTO IE - 69%  FOTO 10/28/2022 - 73%  FOTO 12/9/2022 - 85%  FOTO Goal - 80%    She was not compliant with home exercise program.  Response to previous treatment: no changes  Functional change: decreasing discomfort with ADLs.    Pain: 2/10  Location: right knee      Objective     Kailee received therapeutic exercises to develop strength, endurance, ROM, flexibility, posture, and core stabilization for 50 minutes including:     Patient education:  - stressed HEP consistency  - RTR protocol (walk: jog 3:3 start)    Elliptical x8 min Lvl 4 for CV and LE endurance  YTB sidesteps x2 laps  Dynamic stretching x3 laps ea: quad pull and scoops  Skips fwd x3 laps  A Skip x6 laps  B Skip in place 3x10  Light jog x4 laps      Kailee participated in dynamic functional therapeutic activities to improve functional performance for 10 minutes, including:    Program    Exercise  Sets Foot Contacts Per Set Total Contacts   Double leg hops in place 3 30 90   Double leg hops forward/backward 3 30 90   Double leg hops side to side  3 30 90   Single  leg hops in place  3 20 60   Single leg hops forward/backward 3 20 60   Single leg hops side to side 3 20 60   Single leg forward hops  4 5 20   BOLD = Performed    Held:  Shuttle jumps 1R:  DL landings x3 min  Prancing landings x3 min  SL R landings x3 min      Home Exercises Provided and Patient Education Provided     Education provided:   - see above    Written Home Exercises Provided: Patient instructed to cont prior HEP.  Exercises were reviewed and Kailee was able to demonstrate them prior to the end of the session.  Kailee demonstrated good  understanding of the education provided.     See EMR under Patient Instructions for exercises provided prior visit.    Assessment     FOTO scores exceed goal scores signifying significant improvement in function since start of care.    No s/s of PFJ irritation today.    Able to ayah skipping assessment, hopping assessment, and light jog assessment w/o complaint. Secondary to progress provided RTR protocol for patient to perform. Patient would like to try to complete this ind so advised patient to reach out if symptoms or issues arise.    Kailee Is progressing well towards her goals.   Pt prognosis is Excellent.     Pt will continue to benefit from skilled outpatient physical therapy to address the deficits listed in the problem list box on initial evaluation, provide pt/family education and to maximize pt's level of independence in the home and community environment.     Pt's spiritual, cultural and educational needs considered and pt agreeable to plan of care and goals.    Anticipated barriers to physical therapy: BMI    GOALS: Short Term Goals:  0-4 weeks  1.Report decreased R knee pain  < / =  3/10  to increase tolerance for ADLs and running  2. Negative RF testing  3. Increase strength by 1/3 MMT grade in quad  to increase tolerance for ADL and work activities.  4. Pt to tolerate HEP to improve ROM and independence with ADL's     Long Term Goals: 5-8 weeks  1.Report  decreased R knee pain < / = 1/10  to increase tolerance for ADLs and running  2.Patient goal: no pain with stairs; return to running w/o complaint  3.Increase strength to >/= 4+/5 in quad and hip musculature  to increase tolerance for ADL and work activities.  4. Pt will report at CJ level (20-40% impaired) on FOTO knee to demonstrate increase in LE function with every day tasks.    Plan     RTR program ind; return if symptoms arise.    JUAN JOSE SOTOMAYOR, PT, DPT, OCS

## 2023-03-06 RX ORDER — FLUOXETINE HYDROCHLORIDE 20 MG/1
20 CAPSULE ORAL DAILY
Qty: 90 CAPSULE | Refills: 3 | Status: SHIPPED | OUTPATIENT
Start: 2023-03-06 | End: 2023-09-13

## 2023-06-29 ENCOUNTER — OFFICE VISIT (OUTPATIENT)
Dept: OBSTETRICS AND GYNECOLOGY | Facility: CLINIC | Age: 28
End: 2023-06-29
Payer: COMMERCIAL

## 2023-06-29 VITALS — HEIGHT: 65 IN | WEIGHT: 206.38 LBS | BODY MASS INDEX: 34.38 KG/M2

## 2023-06-29 DIAGNOSIS — Z01.419 ENCOUNTER FOR GYNECOLOGICAL EXAMINATION (GENERAL) (ROUTINE) WITHOUT ABNORMAL FINDINGS: ICD-10-CM

## 2023-06-29 DIAGNOSIS — Z12.4 ENCOUNTER FOR PAPANICOLAOU SMEAR FOR CERVICAL CANCER SCREENING: Primary | ICD-10-CM

## 2023-06-29 DIAGNOSIS — Z11.51 ENCOUNTER FOR SCREENING FOR HUMAN PAPILLOMAVIRUS (HPV): ICD-10-CM

## 2023-06-29 DIAGNOSIS — R87.810 CERVICAL HIGH RISK HPV (HUMAN PAPILLOMAVIRUS) TEST POSITIVE: ICD-10-CM

## 2023-06-29 PROCEDURE — 87624 HPV HI-RISK TYP POOLED RSLT: CPT | Performed by: OBSTETRICS & GYNECOLOGY

## 2023-06-29 PROCEDURE — 99395 PR PREVENTIVE VISIT,EST,18-39: ICD-10-PCS | Mod: S$GLB,,, | Performed by: OBSTETRICS & GYNECOLOGY

## 2023-06-29 PROCEDURE — 88141 PR  CYTOPATH CERV/VAG INTERPRET: ICD-10-PCS | Mod: ,,, | Performed by: PATHOLOGY

## 2023-06-29 PROCEDURE — 88141 CYTOPATH C/V INTERPRET: CPT | Mod: ,,, | Performed by: PATHOLOGY

## 2023-06-29 PROCEDURE — 99395 PREV VISIT EST AGE 18-39: CPT | Mod: S$GLB,,, | Performed by: OBSTETRICS & GYNECOLOGY

## 2023-06-29 PROCEDURE — 99999 PR PBB SHADOW E&M-EST. PATIENT-LVL III: ICD-10-PCS | Mod: PBBFAC,,, | Performed by: OBSTETRICS & GYNECOLOGY

## 2023-06-29 PROCEDURE — 88175 CYTOPATH C/V AUTO FLUID REDO: CPT | Performed by: PATHOLOGY

## 2023-06-29 PROCEDURE — 99999 PR PBB SHADOW E&M-EST. PATIENT-LVL III: CPT | Mod: PBBFAC,,, | Performed by: OBSTETRICS & GYNECOLOGY

## 2023-06-29 RX ORDER — ETONOGESTREL AND ETHINYL ESTRADIOL VAGINAL RING .015; .12 MG/D; MG/D
RING VAGINAL
Qty: 3 EACH | Refills: 3 | Status: SHIPPED | OUTPATIENT
Start: 2023-06-29

## 2023-06-29 NOTE — PROGRESS NOTES
Subjective:       Patient ID: Kailee Messer is a 27 y.o. female.    Chief Complaint:  Annual Exam (Last pap/hpv  abnormal)        History of Present Illness  Kailee Messer is a 27 y.o. female  who presents for annual. Overall doing well. Wants to decrease Prozac from 20mg to 10 mg. Doing well with the Nuvaring, missed last month but plans to restart,. Not sexually active. Last pap abnormal. Reepat pap and HPV today    Patient's last menstrual period was 2023 (approximate).   Date of Last Pap: 2022    Review of Systems  Review of Systems   Constitutional:  Negative for chills and fever.      Objective:   Physical Exam:   Constitutional: She is oriented to person, place, and time. Vital signs are normal. She appears well-developed and well-nourished. No distress.        Pulmonary/Chest: She exhibits no mass. Right breast exhibits no mass, no nipple discharge, no skin change, no tenderness, no bleeding and no swelling. Left breast exhibits no mass, no nipple discharge, no skin change, no tenderness, no bleeding and no swelling. Breasts are symmetrical.        Abdominal: Soft. Bowel sounds are normal. She exhibits no distension and no mass. There is no abdominal tenderness. There is no rebound.     Genitourinary:    Vagina and uterus normal.   There is no rash, tenderness, lesion or injury on the right labia. There is no rash, tenderness, lesion or injury on the left labia. Cervix is normal. Right adnexum displays no mass, no tenderness and no fullness. Left adnexum displays no mass, no tenderness and no fullness. No erythema,  no vaginal discharge, tenderness, rectocele, cystocele or unspecified prolapse of vaginal walls in the vagina. Cervix exhibits no motion tenderness, no discharge and no friability. Uterus is not deviated, not enlarged, not fixed, not tender and not hosting fibroids.           Musculoskeletal: Normal range of motion and moves all extremeties.      Lymphadenopathy:         Right: No supraclavicular adenopathy present.        Left: No supraclavicular adenopathy present.    Neurological: She is alert and oriented to person, place, and time.    Skin: Skin is warm and dry.    Psychiatric: She has a normal mood and affect. Her behavior is normal. Judgment normal.      Assessment/ Plan:     1. Encounter for Papanicolaou smear for cervical cancer screening  Liquid-Based Pap Smear, Screening      2. Encounter for gynecological examination (general) (routine) without abnormal findings  etonogestreL-ethinyl estradioL (NUVARING) 0.12-0.015 mg/24 hr vaginal ring      3. Encounter for screening for human papillomavirus (HPV)  HPV High Risk Genotypes, PCR      4. Cervical high risk HPV (human papillomavirus) test positive  HPV High Risk Genotypes, PCR          No follow-ups on file.    As of April 1, 2021, the Cures Act has been passed nationally. This new law requires that all doctors progress notes, lab results, pathology reports and radiology reports be released IMMEDIATELY to the patient in the patient portal. That means that the results are released to you at the EXACT same time they are released to me. Therefore, with all of the patients that I have I am not able to reply to each patient exactly when the results come in. So there will be a delay from when you see the results to when I see them and have time to come up with a response to send you. Also I only see these results when I am on the computer at work. So if the results come in over the weekend or after 5 pm of a work day, I will not see them until the next business day. As you can tell, this is a challenge as a physician to give every patient the quick response they hope for and deserve. So please be patient! Thanks for understanding, Dr. Mclaughlin

## 2023-06-30 RX ORDER — ETONOGESTREL AND ETHINYL ESTRADIOL VAGINAL RING .015; .12 MG/D; MG/D
1 RING VAGINAL
Qty: 1 EACH | Refills: 11 | OUTPATIENT
Start: 2023-06-30 | End: 2024-06-29

## 2023-07-05 ENCOUNTER — TELEPHONE (OUTPATIENT)
Dept: OBSTETRICS AND GYNECOLOGY | Facility: CLINIC | Age: 28
End: 2023-07-05
Payer: COMMERCIAL

## 2023-07-05 NOTE — TELEPHONE ENCOUNTER
pt---Pharmacy would like to know the monthly quanty of the nuvaring. Please contact [facility] at   [phone] ref#[id number]       7/5/23 @ 3341 Returned Pharmacy call and verified nuvaring prescription and dispensed on 6/23/23.

## 2023-07-10 LAB
FINAL PATHOLOGIC DIAGNOSIS: ABNORMAL
Lab: ABNORMAL

## 2023-07-14 ENCOUNTER — TELEPHONE (OUTPATIENT)
Dept: OBSTETRICS AND GYNECOLOGY | Facility: CLINIC | Age: 28
End: 2023-07-14
Payer: COMMERCIAL

## 2023-08-10 ENCOUNTER — PROCEDURE VISIT (OUTPATIENT)
Dept: OBSTETRICS AND GYNECOLOGY | Facility: CLINIC | Age: 28
End: 2023-08-10
Payer: COMMERCIAL

## 2023-08-10 DIAGNOSIS — Z01.812 PRE-PROCEDURE LAB EXAM: Primary | ICD-10-CM

## 2023-08-10 DIAGNOSIS — R87.810 CERVICAL HIGH RISK HPV (HUMAN PAPILLOMAVIRUS) TEST POSITIVE: ICD-10-CM

## 2023-08-10 DIAGNOSIS — R87.612 LGSIL ON PAP SMEAR OF CERVIX: ICD-10-CM

## 2023-08-10 LAB
B-HCG UR QL: NEGATIVE
CTP QC/QA: YES

## 2023-08-10 PROCEDURE — 57454 BX/CURETT OF CERVIX W/SCOPE: CPT | Mod: S$GLB,,, | Performed by: OBSTETRICS & GYNECOLOGY

## 2023-08-10 PROCEDURE — 88305 TISSUE EXAM BY PATHOLOGIST: CPT | Mod: 26,,, | Performed by: PATHOLOGY

## 2023-08-10 PROCEDURE — 81025 POCT URINE PREGNANCY: ICD-10-PCS | Mod: S$GLB,,, | Performed by: OBSTETRICS & GYNECOLOGY

## 2023-08-10 PROCEDURE — 88305 TISSUE EXAM BY PATHOLOGIST: ICD-10-PCS | Mod: 26,,, | Performed by: PATHOLOGY

## 2023-08-10 PROCEDURE — 81025 URINE PREGNANCY TEST: CPT | Mod: S$GLB,,, | Performed by: OBSTETRICS & GYNECOLOGY

## 2023-08-10 PROCEDURE — 88305 TISSUE EXAM BY PATHOLOGIST: CPT | Performed by: PATHOLOGY

## 2023-08-10 PROCEDURE — 57454 COLPOSCOPY: ICD-10-PCS | Mod: S$GLB,,, | Performed by: OBSTETRICS & GYNECOLOGY

## 2023-08-10 NOTE — PROCEDURES
Colposcopy    Date/Time: 8/10/2023 11:15 AM    Performed by: Kamla Mclaughlin MD  Authorized by: Kamla Mclaughlin MD    Consent Done?:  Yes (Written)  Timeout:Immediately prior to procedure a time out was called to verify the correct patient, procedure, equipment, support staff and site/side marked as required  Prep:Patient was prepped and draped in the usual sterile fashion  Assistants?: No      Colposcopy Site:  Cervix  Position:  Supine  Acrowhite Lesion? Yes    Atypical Vessels: No    Transformation Zone Adequate?: Yes    Biopsy?: Yes         Location:  Cervix ((11 00))  ECC Performed?: Yes    LEEP Performed?: No    Estimated blood loss (cc):  5   Patient tolerated the procedure well with no immediate complications.   Post-operative instructions were provided for the patient.   Patient was discharged and will follow up if any complications occur     Mild dysplasia by anselmo

## 2023-08-15 LAB
FINAL PATHOLOGIC DIAGNOSIS: NORMAL
GROSS: NORMAL
Lab: NORMAL

## 2023-09-13 ENCOUNTER — OFFICE VISIT (OUTPATIENT)
Dept: PRIMARY CARE CLINIC | Facility: CLINIC | Age: 28
End: 2023-09-13
Payer: COMMERCIAL

## 2023-09-13 VITALS
HEIGHT: 65 IN | OXYGEN SATURATION: 98 % | DIASTOLIC BLOOD PRESSURE: 88 MMHG | SYSTOLIC BLOOD PRESSURE: 128 MMHG | BODY MASS INDEX: 35.23 KG/M2 | HEART RATE: 91 BPM | WEIGHT: 211.44 LBS

## 2023-09-13 DIAGNOSIS — K58.0 IRRITABLE BOWEL SYNDROME WITH DIARRHEA: ICD-10-CM

## 2023-09-13 DIAGNOSIS — Z00.00 ANNUAL PHYSICAL EXAM: Primary | ICD-10-CM

## 2023-09-13 DIAGNOSIS — R63.5 WEIGHT GAIN: ICD-10-CM

## 2023-09-13 DIAGNOSIS — F41.1 GAD (GENERALIZED ANXIETY DISORDER): ICD-10-CM

## 2023-09-13 DIAGNOSIS — E66.9 OBESITY (BMI 35.0-39.9 WITHOUT COMORBIDITY): ICD-10-CM

## 2023-09-13 PROCEDURE — 99999 PR PBB SHADOW E&M-EST. PATIENT-LVL IV: ICD-10-PCS | Mod: PBBFAC,,, | Performed by: STUDENT IN AN ORGANIZED HEALTH CARE EDUCATION/TRAINING PROGRAM

## 2023-09-13 PROCEDURE — 99999 PR PBB SHADOW E&M-EST. PATIENT-LVL IV: CPT | Mod: PBBFAC,,, | Performed by: STUDENT IN AN ORGANIZED HEALTH CARE EDUCATION/TRAINING PROGRAM

## 2023-09-13 PROCEDURE — 99395 PR PREVENTIVE VISIT,EST,18-39: ICD-10-PCS | Mod: S$GLB,,, | Performed by: STUDENT IN AN ORGANIZED HEALTH CARE EDUCATION/TRAINING PROGRAM

## 2023-09-13 PROCEDURE — 99395 PREV VISIT EST AGE 18-39: CPT | Mod: S$GLB,,, | Performed by: STUDENT IN AN ORGANIZED HEALTH CARE EDUCATION/TRAINING PROGRAM

## 2023-09-13 RX ORDER — FLUOXETINE 10 MG/1
10 CAPSULE ORAL DAILY
Qty: 90 CAPSULE | Refills: 3 | Status: SHIPPED | OUTPATIENT
Start: 2023-09-13 | End: 2024-03-08

## 2023-09-13 NOTE — PATIENT INSTRUCTIONS
For the next 1 month, work on implementing the following:  -  Aim for [id number] steps/day  - 150+ minutes per week total  of moderate intensity cardio exercise (does not include walking)  - 15 minutes 3x per week of strength training exercise (weights, Pilates, Body weight exercise)  - Drink a total of 70+ ounces of water each day  -  Drink an 8 oz glass of water immediately before eating each meal.  -  Aim for eating between 1300-1500cal/day (Use Sqrrl Pal Emily to log calories ingested)  -  Make half of the plate with each meal a vegetable.  For the other half, it should be comprised of lean protein (fish, chickpeas, lean chicken, etc) or whole grain carbohydrate.  -  Avoid cooking with oils (butter, olive oil, etc.).   - Record your weight once a week 1st thing in the morning after using the restroom.  -  Keep a record of your inches once a week (arms, hips, thighs, waist)

## 2023-09-13 NOTE — PROGRESS NOTES
SUBJECTIVE     Chief Complaint   Patient presents with    Annual Exam    Establish Care       HPI  Kailee Messer is a 28 y.o. female with IBS, and DELISA that presents for annual exam. Pt is establishing care with me today.    Pt is UTD on age appropriate CA screening.    Family, social, surgical Hx reviewed       DELISA:  Fluoxetine 10mg daily. Sxs stable.    Obesity:  BMI 35.18 in clinic today  Recently increasing weekly exercise. Now dog walking which increases steps.  Diet-balanced    Health Maintenance         Date Due Completion Date    Hepatitis C Screening Never done ---    COVID-19 Vaccine (1) Never done ---    Pap Smear 06/29/2026 6/29/2023    TETANUS VACCINE 07/04/2032 7/4/2022              PAST MEDICAL HISTORY:  Past Medical History:   Diagnosis Date    Acid reflux     Counseling for HPV (human papillomavirus) vaccination 2020    Received all 3, per pt     Dysmenorrhea     Uses vaginal contraceptive ring        PAST SURGICAL HISTORY:  Past Surgical History:   Procedure Laterality Date    FOOT SURGERY Left     WISDOM TOOTH EXTRACTION         SOCIAL HISTORY:  Social History     Socioeconomic History    Marital status: Single   Tobacco Use    Smoking status: Never    Smokeless tobacco: Never   Substance and Sexual Activity    Alcohol use: Yes     Alcohol/week: 6.0 standard drinks of alcohol     Types: 6 Glasses of wine per week     Comment: 3-4 x per week     Drug use: No    Sexual activity: Not Currently     Partners: Male     Birth control/protection: Inserts     Comment: Single:   Nuvaring continuous        FAMILY HISTORY:  Family History   Problem Relation Age of Onset    Hyperlipidemia Mother     Alcohol abuse Mother     Hypertension Father     Hypertension Sister     Hypertension Paternal Grandmother     Hypertension Paternal Grandfather     Diabetes type II Paternal Grandfather     Heart disease Paternal Grandfather     Breast cancer Paternal Aunt         great aunt    Cancer Paternal Uncle         great  "uncle    Colon cancer Neg Hx     Ovarian cancer Neg Hx        ALLERGIES AND MEDICATIONS: updated and reviewed.  Review of patient's allergies indicates:  No Known Allergies  Current Outpatient Medications   Medication Sig Dispense Refill    etonogestreL-ethinyl estradioL (NUVARING) 0.12-0.015 mg/24 hr vaginal ring INSERT ONE RING VAGINALLY  EVERY 21 DAYS CONTINUOUSLY. 3 each 3    FLUoxetine 10 MG capsule Take 1 capsule (10 mg total) by mouth once daily. 90 capsule 3     No current facility-administered medications for this visit.       ROS  Review of Systems   Constitutional:  Negative for fever and weight loss.   Respiratory:  Negative for cough and shortness of breath.    Cardiovascular:  Negative for chest pain and palpitations.   Gastrointestinal:  Negative for abdominal pain, constipation, diarrhea, nausea and vomiting.   Genitourinary:  Negative for dysuria.   Musculoskeletal:  Negative for back pain and joint pain.   Skin:  Negative for rash.   Neurological:  Negative for dizziness, weakness and headaches.   Psychiatric/Behavioral:  Negative for depression. The patient is not nervous/anxious.          OBJECTIVE     Physical Exam  Vitals:    09/13/23 1307   BP: 128/88   Pulse: 91    Body mass index is 35.18 kg/m².  Weight: 95.9 kg (211 lb 6.7 oz)   Height: 5' 5" (165.1 cm)     Physical Exam  HENT:      Head: Normocephalic and atraumatic.      Nose: Nose normal.      Mouth/Throat:      Mouth: Mucous membranes are moist.      Pharynx: Oropharynx is clear.   Eyes:      Extraocular Movements: Extraocular movements intact.      Conjunctiva/sclera: Conjunctivae normal.      Pupils: Pupils are equal, round, and reactive to light.   Cardiovascular:      Rate and Rhythm: Normal rate and regular rhythm.   Pulmonary:      Effort: Pulmonary effort is normal.      Breath sounds: Normal breath sounds.   Musculoskeletal:         General: No swelling. Normal range of motion.      Cervical back: Normal range of motion.      " Right lower leg: No edema.      Left lower leg: No edema.   Skin:     General: Skin is warm.      Findings: No lesion or rash.   Neurological:      General: No focal deficit present.      Mental Status: She is alert and oriented to person, place, and time.      Motor: No weakness.   Psychiatric:         Mood and Affect: Mood normal.         Thought Content: Thought content normal.               ASSESSMENT     28 y.o. female with     1. Annual physical exam    2. Obesity (BMI 35.0-39.9 without comorbidity)    3. Irritable bowel syndrome with diarrhea    4. DELISA (generalized anxiety disorder)    5. Weight gain        PLAN:     1. Annual physical exam  -     TSH; Future; Expected date: 09/13/2023  -     Lipid Panel; Future; Expected date: 09/13/2023  -     Comprehensive Metabolic Panel; Future; Expected date: 09/13/2023  -     CBC Auto Differential; Future; Expected date: 09/13/2023  -     Insulin, random; Future; Expected date: 09/13/2023    2. Obesity (BMI 35.0-39.9 without comorbidity)  -     Hemoglobin A1C; Future; Expected date: 09/13/2023  -     Insulin, random; Future; Expected date: 09/13/2023    3. Irritable bowel syndrome with diarrhea  -     Ambulatory referral/consult to Gastroenterology; Future; Expected date: 09/20/2023    4. DELISA (generalized anxiety disorder)  Overview:  Fluoxetine 10mg daily    Stable on medications, continue regimen      Orders:  -     FLUoxetine 10 MG capsule; Take 1 capsule (10 mg total) by mouth once daily.  Dispense: 90 capsule; Refill: 3    5. Weight gain  -     Vitamin D; Future; Expected date: 09/13/2023        Discussed age and gender appropriate screenings at this visit and encouraged a healthy diet low in simple carbohydrates, and increased physical activity.  Counseled on medically appropriate vaccines based on age and current health condition.  Screening test reviewed and discussed with patient.      RTC in 1 year     Noemi Marsh MD

## 2023-09-14 ENCOUNTER — PATIENT MESSAGE (OUTPATIENT)
Dept: PRIMARY CARE CLINIC | Facility: CLINIC | Age: 28
End: 2023-09-14
Payer: COMMERCIAL

## 2023-09-17 PROBLEM — F41.1 GAD (GENERALIZED ANXIETY DISORDER): Status: ACTIVE | Noted: 2023-09-17

## 2023-09-19 NOTE — PROGRESS NOTES
[facility] Consultation Note    Reason for Consult:  The encounter diagnosis was Irritable bowel syndrome with diarrhea.    PCP:   Noemi Marsh   [address] / [id number]    Referring MD:  Noemi Marsh Md  [address] [place] [address]    Initial History of Present Illness (HPI):  This is a 28 y.o. female here for evaluation of IBS-D, had been seeing Dr Staley  Thinks she has IBS  Tried probiotics for 30 days but was under a lot of stress  Did cut out garlic and onions which has reduced    Symptoms started around 2020 - big stressors at that time  Lots of fatigue at that time  Now with better job - decreasing prozac  But still with significant bloating, frequent diarrhea    Drinks water and ginger ale to help settle things down    Takes nexium OTC +/- tums    ROS:  Constitutional: No fevers, chills, No weight loss  ENT: No allergies  CV: No chest pain  Pulm: No cough, No shortness of breath  Ophtho: No vision changes  GI: see HPI  Derm: No rash  Heme: No lymphadenopathy, No bruising  MSK: No arthritis  : No dysuria, No hematuria  Endo: No hot or cold intolerance  Neuro: No syncope, No seizure  Psych: No anxiety, No depression    Medical History:  has a past medical history of Acid reflux, Counseling for HPV (human papillomavirus) vaccination (2020), Dysmenorrhea, and Uses vaginal contraceptive ring.    Surgical History:  has a past surgical history that includes [place] tooth extraction and Foot surgery (Left).    Family History: family history includes Alcohol abuse in her mother; Breast cancer in her paternal aunt; Cancer in her paternal uncle; Diabetes type II in her paternal grandfather; Heart disease in her paternal grandfather; Hyperlipidemia in her mother; Hypertension in her father, paternal grandfather, paternal grandmother, and sister..     Social History:  reports that she has never smoked. She has never used smokeless tobacco. She  "reports current alcohol use of about 6.0 standard drinks of alcohol per week. She reports that she does not use drugs.    Review of patient's allergies indicates:  No Known Allergies    Medication List with Changes/Refills   Current Medications    ETONOGESTREL-ETHINYL ESTRADIOL (NUVARING) 0.12-0.015 MG/24 HR VAGINAL RING    INSERT ONE RING VAGINALLY  EVERY 21 DAYS CONTINUOUSLY.    FLUOXETINE 10 MG CAPSULE    Take 1 capsule (10 mg total) by mouth once daily.         Objective Findings:    Vital Signs:  /86   Pulse 89   Ht 5' 4" (1.626 m)   Wt 94.5 kg (208 lb 5.4 oz)   LMP 08/20/2023 (Approximate)   BMI 35.76 kg/m²   Body mass index is 35.76 kg/m².    Physical Exam:  General Appearance: Well appearing in no acute distress  Head:   Normocephalic, without obvious abnormality  Eyes:    No scleral icterus, EOMI  ENT: Neck supple, Lips, mucosa, and tongue normal; teeth and gums normal  Lungs: CTA bilaterally in anterior and posterior fields, no wheezes, no crackles.  Heart:  Regular rate and rhythm, S1, S2 normal, no murmurs heard  Abdomen: Soft, non tender, non distended with positive bowel sounds in all four quadrants. No hepatosplenomegaly, ascites, or mass  Extremities: 2+ pulses, no clubbing, cyanosis or edema  Skin: No rash  Neurologic: CN II-XII intact      Labs:  Lab Results   Component Value Date    WBC 8.01 04/10/2008    HGB 14.1 10/20/2008    HCT 42.8 10/20/2008     04/10/2008    CHOL 165 05/27/2011       No results found for: "HPYLORINTERP"  No results found for: "HPYLORIANTIG"    Stool samples - wnl  Outside labs - wnl    Imaging:    Endoscopy:    Colon 21 - wnl      Assessment:  1. Irritable bowel syndrome with diarrhea           Recommendations:  1. Check celiac testing  2. Trial of fodzyme  3. Bentyl prn  4. Go to xifaxan if no improvement with # 3  5. Records from Dr Staley    Could get ultrasound for GB if no improvements    No follow-ups on file.      Order summary:  Orders Placed " This Encounter    Celiac Disease Panel         Thank you so much for allowing me to participate in the care of Kailee Owens MD

## 2023-09-20 ENCOUNTER — OFFICE VISIT (OUTPATIENT)
Dept: GASTROENTEROLOGY | Facility: CLINIC | Age: 28
End: 2023-09-20
Payer: COMMERCIAL

## 2023-09-20 VITALS
HEIGHT: 64 IN | BODY MASS INDEX: 35.56 KG/M2 | SYSTOLIC BLOOD PRESSURE: 120 MMHG | WEIGHT: 208.31 LBS | HEART RATE: 89 BPM | DIASTOLIC BLOOD PRESSURE: 86 MMHG

## 2023-09-20 DIAGNOSIS — K58.0 IRRITABLE BOWEL SYNDROME WITH DIARRHEA: ICD-10-CM

## 2023-09-20 PROCEDURE — 99999 PR PBB SHADOW E&M-EST. PATIENT-LVL III: ICD-10-PCS | Mod: PBBFAC,,, | Performed by: INTERNAL MEDICINE

## 2023-09-20 PROCEDURE — 99999 PR PBB SHADOW E&M-EST. PATIENT-LVL III: CPT | Mod: PBBFAC,,, | Performed by: INTERNAL MEDICINE

## 2023-09-20 PROCEDURE — 99204 OFFICE O/P NEW MOD 45 MIN: CPT | Mod: S$GLB,,, | Performed by: INTERNAL MEDICINE

## 2023-09-20 PROCEDURE — 99204 PR OFFICE/OUTPT VISIT, NEW, LEVL IV, 45-59 MIN: ICD-10-PCS | Mod: S$GLB,,, | Performed by: INTERNAL MEDICINE

## 2023-09-20 RX ORDER — DICYCLOMINE HYDROCHLORIDE 10 MG/1
10 CAPSULE ORAL
Qty: 90 CAPSULE | Refills: 3 | Status: SHIPPED | OUTPATIENT
Start: 2023-09-20

## 2023-09-20 NOTE — PATIENT INSTRUCTIONS
[email or web address] for digestive enzymes    Soluble fiber    Sunfiber - 6g/scoop. Guar gum and soluble fiber   [facility] for tomorrow's nutrition SunFiber    Elaine's Tummy Fiber - 6g/scoop. Khadijah senegal prebiotic and soluble fiber

## 2023-09-20 NOTE — PROGRESS NOTES
"GENERAL GI PATIENT INTAKE:    COVID symptoms in the last 7 days (runny nose, sore throat, congestion, cough, fever): No  PCP: Noemi Marsh  If not PCP-  number given to establish [phone]: N/A    ALLERGIES REVIEWED:  No    CHIEF COMPLAINT:    Chief Complaint   Patient presents with    Diarrhea       VITAL SIGNS:  /86   Pulse 89   Ht 5' 4" (1.626 m)   Wt 94.5 kg (208 lb 5.4 oz)   LMP 08/20/2023 (Approximate)   BMI 35.76 kg/m²      Change in medical, surgical, family or social history: No      REVIEWED MEDICATION LIST RECONCILED INCLUDING ABOVE MEDS:  Yes     "

## 2023-09-22 ENCOUNTER — LAB VISIT (OUTPATIENT)
Dept: LAB | Facility: HOSPITAL | Age: 28
End: 2023-09-22
Payer: COMMERCIAL

## 2023-09-22 DIAGNOSIS — Z00.00 ANNUAL PHYSICAL EXAM: ICD-10-CM

## 2023-09-22 DIAGNOSIS — K58.0 IRRITABLE BOWEL SYNDROME WITH DIARRHEA: ICD-10-CM

## 2023-09-22 DIAGNOSIS — E66.9 OBESITY (BMI 35.0-39.9 WITHOUT COMORBIDITY): ICD-10-CM

## 2023-09-22 DIAGNOSIS — R63.5 WEIGHT GAIN: ICD-10-CM

## 2023-09-22 LAB
25(OH)D3+25(OH)D2 SERPL-MCNC: 87 NG/ML (ref 30–96)
ALBUMIN SERPL BCP-MCNC: 3.7 G/DL (ref 3.5–5.2)
ALP SERPL-CCNC: 63 U/L (ref 55–135)
ALT SERPL W/O P-5'-P-CCNC: 30 U/L (ref 10–44)
ANION GAP SERPL CALC-SCNC: 12 MMOL/L (ref 8–16)
AST SERPL-CCNC: 26 U/L (ref 10–40)
BASOPHILS # BLD AUTO: 0.08 K/UL (ref 0–0.2)
BASOPHILS NFR BLD: 1 % (ref 0–1.9)
BILIRUB SERPL-MCNC: 0.4 MG/DL (ref 0.1–1)
BUN SERPL-MCNC: 7 MG/DL (ref 6–20)
CALCIUM SERPL-MCNC: 8.8 MG/DL (ref 8.7–10.5)
CHLORIDE SERPL-SCNC: 106 MMOL/L (ref 95–110)
CHOLEST SERPL-MCNC: 203 MG/DL (ref 120–199)
CHOLEST/HDLC SERPL: 4.2 {RATIO} (ref 2–5)
CO2 SERPL-SCNC: 20 MMOL/L (ref 23–29)
CREAT SERPL-MCNC: 0.7 MG/DL (ref 0.5–1.4)
DIFFERENTIAL METHOD: ABNORMAL
EOSINOPHIL # BLD AUTO: 0.2 K/UL (ref 0–0.5)
EOSINOPHIL NFR BLD: 1.9 % (ref 0–8)
ERYTHROCYTE [DISTWIDTH] IN BLOOD BY AUTOMATED COUNT: 13.5 % (ref 11.5–14.5)
EST. GFR  (NO RACE VARIABLE): >60 ML/MIN/1.73 M^2
ESTIMATED AVG GLUCOSE: 117 MG/DL (ref 68–131)
GLUCOSE SERPL-MCNC: 94 MG/DL (ref 70–110)
HBA1C MFR BLD: 5.7 % (ref 4–5.6)
HCT VFR BLD AUTO: 39.6 % (ref 37–48.5)
HDLC SERPL-MCNC: 48 MG/DL (ref 40–75)
HDLC SERPL: 23.6 % (ref 20–50)
HGB BLD-MCNC: 12.4 G/DL (ref 12–16)
IMM GRANULOCYTES # BLD AUTO: 0.02 K/UL (ref 0–0.04)
IMM GRANULOCYTES NFR BLD AUTO: 0.2 % (ref 0–0.5)
INSULIN COLLECTION INTERVAL: NORMAL
INSULIN SERPL-ACNC: 6 UU/ML
LDLC SERPL CALC-MCNC: 111 MG/DL (ref 63–159)
LYMPHOCYTES # BLD AUTO: 3.7 K/UL (ref 1–4.8)
LYMPHOCYTES NFR BLD: 44.4 % (ref 18–48)
MCH RBC QN AUTO: 27.5 PG (ref 27–31)
MCHC RBC AUTO-ENTMCNC: 31.3 G/DL (ref 32–36)
MCV RBC AUTO: 88 FL (ref 82–98)
MONOCYTES # BLD AUTO: 0.6 K/UL (ref 0.3–1)
MONOCYTES NFR BLD: 6.7 % (ref 4–15)
NEUTROPHILS # BLD AUTO: 3.8 K/UL (ref 1.8–7.7)
NEUTROPHILS NFR BLD: 45.8 % (ref 38–73)
NONHDLC SERPL-MCNC: 155 MG/DL
NRBC BLD-RTO: 0 /100 WBC
PLATELET # BLD AUTO: 318 K/UL (ref 150–450)
PMV BLD AUTO: 11 FL (ref 9.2–12.9)
POTASSIUM SERPL-SCNC: 4.4 MMOL/L (ref 3.5–5.1)
PROT SERPL-MCNC: 7.4 G/DL (ref 6–8.4)
RBC # BLD AUTO: 4.51 M/UL (ref 4–5.4)
SODIUM SERPL-SCNC: 138 MMOL/L (ref 136–145)
TRIGL SERPL-MCNC: 220 MG/DL (ref 30–150)
TSH SERPL DL<=0.005 MIU/L-ACNC: 1.78 UIU/ML (ref 0.4–4)
WBC # BLD AUTO: 8.33 K/UL (ref 3.9–12.7)

## 2023-09-22 PROCEDURE — 85025 COMPLETE CBC W/AUTO DIFF WBC: CPT | Performed by: STUDENT IN AN ORGANIZED HEALTH CARE EDUCATION/TRAINING PROGRAM

## 2023-09-22 PROCEDURE — 83525 ASSAY OF INSULIN: CPT | Performed by: STUDENT IN AN ORGANIZED HEALTH CARE EDUCATION/TRAINING PROGRAM

## 2023-09-22 PROCEDURE — 80053 COMPREHEN METABOLIC PANEL: CPT | Performed by: STUDENT IN AN ORGANIZED HEALTH CARE EDUCATION/TRAINING PROGRAM

## 2023-09-22 PROCEDURE — 84443 ASSAY THYROID STIM HORMONE: CPT | Performed by: STUDENT IN AN ORGANIZED HEALTH CARE EDUCATION/TRAINING PROGRAM

## 2023-09-22 PROCEDURE — 82306 VITAMIN D 25 HYDROXY: CPT | Performed by: STUDENT IN AN ORGANIZED HEALTH CARE EDUCATION/TRAINING PROGRAM

## 2023-09-22 PROCEDURE — 86364 TISS TRNSGLTMNASE EA IG CLAS: CPT | Performed by: INTERNAL MEDICINE

## 2023-09-22 PROCEDURE — 80061 LIPID PANEL: CPT | Performed by: STUDENT IN AN ORGANIZED HEALTH CARE EDUCATION/TRAINING PROGRAM

## 2023-09-22 PROCEDURE — 83036 HEMOGLOBIN GLYCOSYLATED A1C: CPT | Performed by: STUDENT IN AN ORGANIZED HEALTH CARE EDUCATION/TRAINING PROGRAM

## 2023-09-25 ENCOUNTER — PATIENT MESSAGE (OUTPATIENT)
Dept: GASTROENTEROLOGY | Facility: CLINIC | Age: 28
End: 2023-09-25
Payer: COMMERCIAL

## 2023-09-25 LAB
GLIADIN PEPTIDE IGA SER-ACNC: 1.6 U/ML
GLIADIN PEPTIDE IGG SER-ACNC: <0.6 U/ML
IGA SERPL-MCNC: 311 MG/DL (ref 70–400)
TTG IGA SER-ACNC: 0.6 U/ML
TTG IGG SER-ACNC: <0.6 U/ML

## 2023-10-11 NOTE — PROGRESS NOTES
Outside records received  Stool samples normal  Scopes normal  CRP was 12, so consider repeat at followup to see if normalized  If not then imaging for SB disease/ Crohn's

## 2023-11-16 ENCOUNTER — OFFICE VISIT (OUTPATIENT)
Dept: PRIMARY CARE CLINIC | Facility: CLINIC | Age: 28
End: 2023-11-16
Payer: COMMERCIAL

## 2023-11-16 VITALS
TEMPERATURE: 99 F | OXYGEN SATURATION: 96 % | SYSTOLIC BLOOD PRESSURE: 122 MMHG | BODY MASS INDEX: 35.84 KG/M2 | HEART RATE: 100 BPM | DIASTOLIC BLOOD PRESSURE: 82 MMHG | WEIGHT: 208.75 LBS

## 2023-11-16 DIAGNOSIS — J02.9 PHARYNGITIS, UNSPECIFIED ETIOLOGY: Primary | ICD-10-CM

## 2023-11-16 LAB
CTP QC/QA: YES
S PYO RRNA THROAT QL PROBE: NEGATIVE

## 2023-11-16 PROCEDURE — 99214 PR OFFICE/OUTPT VISIT, EST, LEVL IV, 30-39 MIN: ICD-10-PCS | Mod: S$GLB,,, | Performed by: NURSE PRACTITIONER

## 2023-11-16 PROCEDURE — 99999 PR PBB SHADOW E&M-EST. PATIENT-LVL III: CPT | Mod: PBBFAC,,, | Performed by: NURSE PRACTITIONER

## 2023-11-16 PROCEDURE — 87880 POCT RAPID STREP A: ICD-10-PCS | Mod: QW,S$GLB,, | Performed by: NURSE PRACTITIONER

## 2023-11-16 PROCEDURE — 99214 OFFICE O/P EST MOD 30 MIN: CPT | Mod: S$GLB,,, | Performed by: NURSE PRACTITIONER

## 2023-11-16 PROCEDURE — 99999 PR PBB SHADOW E&M-EST. PATIENT-LVL III: ICD-10-PCS | Mod: PBBFAC,,, | Performed by: NURSE PRACTITIONER

## 2023-11-16 PROCEDURE — 87880 STREP A ASSAY W/OPTIC: CPT | Mod: QW,S$GLB,, | Performed by: NURSE PRACTITIONER

## 2023-11-16 RX ORDER — METHYLPREDNISOLONE 4 MG/1
TABLET ORAL
Qty: 21 EACH | Refills: 0 | Status: SHIPPED | OUTPATIENT
Start: 2023-11-16 | End: 2023-12-07

## 2023-11-16 RX ORDER — AZITHROMYCIN 250 MG/1
TABLET, FILM COATED ORAL
Qty: 6 TABLET | Refills: 0 | Status: SHIPPED | OUTPATIENT
Start: 2023-11-16 | End: 2023-11-21

## 2023-11-16 NOTE — PROGRESS NOTES
[facility] Note    Chief Complaint      Chief Complaint   Patient presents with    Sore Throat       History of Present Illness      Kailee Messer is a 28 y.o. female with chronic conditions of GERD, DELISA who presents today for: complaining of intermittent sore throat for a few weeks, but the past couple of days has been more persistent and has noticed green mucus. Painful to swallow. No fever, or chills.   Does have cough. Going to [place] to visit sister for Thanksgiving.   Home test for COVID negative.     Past Medical History:  Past Medical History:   Diagnosis Date    Acid reflux     Counseling for HPV (human papillomavirus) vaccination 2020    Received all 3, per pt     Dysmenorrhea     Uses vaginal contraceptive ring        Past Surgical History:   has a past surgical history that includes [place] tooth extraction and Foot surgery (Left).    Family History:  family history includes Alcohol abuse in her mother; Breast cancer in her paternal aunt; Cancer in her paternal uncle; Diabetes type II in her paternal grandfather; Heart disease in her paternal grandfather; Hyperlipidemia in her mother; Hypertension in her father, paternal grandfather, paternal grandmother, and sister.     Social History:  Social History     Tobacco Use    Smoking status: Never    Smokeless tobacco: Never   Substance Use Topics    Alcohol use: Yes     Alcohol/week: 6.0 standard drinks of alcohol     Types: 6 Glasses of wine per week     Comment: 3-4 x per week     Drug use: No       Review of Systems   Constitutional:  Negative for chills and fever.   HENT:  Positive for congestion, ear pain and sore throat. Negative for ear discharge.    Respiratory:  Positive for cough. Negative for shortness of breath and stridor.    Cardiovascular:  Negative for chest pain.   Gastrointestinal:  Negative for abdominal pain, diarrhea and vomiting.   Genitourinary:  Negative for dysuria.   Musculoskeletal:  Negative for myalgias.    Neurological:  Negative for headaches.   Psychiatric/Behavioral:  Negative for depression.         Medications:  Outpatient Encounter Medications as of 11/16/2023   Medication Sig Dispense Refill    dicyclomine (BENTYL) 10 MG capsule Take 1 capsule (10 mg total) by mouth before meals as needed (abdominal pain). 90 capsule 3    etonogestreL-ethinyl estradioL (NUVARING) 0.12-0.015 mg/24 hr vaginal ring INSERT ONE RING VAGINALLY  EVERY 21 DAYS CONTINUOUSLY. 3 each 3    FLUoxetine 10 MG capsule Take 1 capsule (10 mg total) by mouth once daily. 90 capsule 3    azithromycin (Z-BENJAMIN) 250 MG tablet Take 2 tablets by mouth on day 1; Take 1 tablet by mouth on days 2-5 6 tablet 0    methylPREDNISolone (MEDROL DOSEPACK) 4 mg tablet use as directed 21 each 0     No facility-administered encounter medications on file as of 11/16/2023.       Allergies:  Review of patient's allergies indicates:  No Known Allergies    Health Maintenance:  Immunization History   Administered Date(s) Administered    Tdap 07/04/2022      Health Maintenance   Topic Date Due    Hepatitis C Screening  Never done    Pap Smear  06/29/2026    TETANUS VACCINE  07/04/2032    Lipid Panel  Completed        Physical Exam      Vital Signs  Temp: 98.7 °F (37.1 °C)  Pulse: 100  SpO2: 96 %  BP: 122/82  BP Location: Left arm  Pain Score:   7  Pain Loc: Throat  Height and Weight  Weight: 94.7 kg (208 lb 12.4 oz)]    Physical Exam  Constitutional:       Appearance: She is well-developed.   HENT:      Head: Normocephalic and atraumatic.      Right Ear: A middle ear effusion is present.      Left Ear: Tympanic membrane normal.      Nose: Nose normal.      Mouth/Throat:      Mouth: Mucous membranes are moist.      Pharynx: Posterior oropharyngeal erythema present.   Eyes:      Pupils: Pupils are equal, round, and reactive to light.   Cardiovascular:      Rate and Rhythm: Normal rate and regular rhythm.      Heart sounds: Normal heart sounds. No murmur heard.  Pulmonary:       Effort: Pulmonary effort is normal. No respiratory distress.      Breath sounds: Normal breath sounds.   Abdominal:      General: There is no distension.      Palpations: Abdomen is soft.      Tenderness: There is no abdominal tenderness. There is no guarding.   Musculoskeletal:         General: Normal range of motion.      Cervical back: Normal range of motion.   Skin:     General: Skin is warm and dry.   Neurological:      Mental Status: She is alert. Mental status is at baseline.   Psychiatric:         Behavior: Behavior normal.          Laboratory:  CBC:  Recent Labs   Lab 09/22/23  0843   WBC 8.33   RBC 4.51   Hemoglobin 12.4   Hematocrit 39.6   Platelets 318   MCV 88   MCH 27.5   MCHC 31.3 L     CMP:  Recent Labs   Lab 09/22/23  0843   Glucose 94   Calcium 8.8   Albumin 3.7   Total Protein 7.4   Sodium 138   Potassium 4.4   CO2 20 L   Chloride 106   BUN 7   Alkaline Phosphatase 63   ALT 30   AST 26   Total Bilirubin 0.4     URINALYSIS:       LIPIDS:  Recent Labs   Lab 09/22/23  0843   TSH 1.785   HDL 48   Cholesterol 203 H   Triglycerides 220 H   LDL Cholesterol 111.0   HDL/Cholesterol Ratio 23.6   Non-HDL Cholesterol 155   Total Cholesterol/HDL Ratio 4.2     TSH:  Recent Labs   Lab 09/22/23  0843   TSH 1.785     A1C:  Recent Labs   Lab 09/22/23  0843   Hemoglobin A1C 5.7 H       Assessment/Plan     Kailee Messer is a 28 y.o.female with:    1. Pharyngitis, unspecified etiology  - POCT Rapid Strep A- negative in office  - Home Covid negative   - methylPREDNISolone (MEDROL DOSEPACK) 4 mg tablet; use as directed  Dispense: 21 each; Refill: 0  - azithromycin (Z-BENJAMIN) 250 MG tablet; Take 2 tablets by mouth on day 1; Take 1 tablet by mouth on days 2-5  Dispense: 6 tablet; Refill: 0  Follow up If no improvement.     Chronic conditions status updated as per HPI.  Other than changes above, cont current medications and maintain follow up with specialists.  No follow-ups on file.    Future Appointments   Date Time  Provider Department [place]   3/8/2024 12:30 PM Anand Owens MD [facility] [address]aya, FNP [facility]    Answers submitted by the patient for this visit:  Sore Throat Questionnaire (Submitted on 11/16/2023)  Chief Complaint: Sore throat  Chronicity: new  Onset: in the past 7 days  Progression since onset: gradually worsening  Pain worse on: left  Fever: no fever  Pain - numeric: 7/10  drooling: No  hoarse voice: Yes  plugged ear sensation: No  swollen glands: Yes  trouble swallowing: Yes  strep: No  mono: No  Treatments tried: NSAIDs, cool liquids  Improvement on treatment: mild  Pain severity: moderate

## 2023-12-14 NOTE — TELEPHONE ENCOUNTER
----- Message from Kamla Mclaughlin MD sent at 7/12/2023  3:30 PM CDT -----  Call pt. Needs to do colpo again. Please schedule  
Lvm to schedule colpo. Pt can be scheduled in next available procedure slot.   
The patient is a 73y Female complaining of cough.

## 2024-03-08 ENCOUNTER — TELEPHONE (OUTPATIENT)
Dept: ENDOSCOPY | Facility: HOSPITAL | Age: 29
End: 2024-03-08
Payer: COMMERCIAL

## 2024-03-08 ENCOUNTER — PATIENT MESSAGE (OUTPATIENT)
Dept: GASTROENTEROLOGY | Facility: CLINIC | Age: 29
End: 2024-03-08

## 2024-03-08 ENCOUNTER — LAB VISIT (OUTPATIENT)
Dept: LAB | Facility: HOSPITAL | Age: 29
End: 2024-03-08
Attending: INTERNAL MEDICINE
Payer: COMMERCIAL

## 2024-03-08 ENCOUNTER — OFFICE VISIT (OUTPATIENT)
Dept: GASTROENTEROLOGY | Facility: CLINIC | Age: 29
End: 2024-03-08
Payer: COMMERCIAL

## 2024-03-08 VITALS — WEIGHT: 205 LBS | BODY MASS INDEX: 35 KG/M2 | HEIGHT: 64 IN

## 2024-03-08 VITALS
WEIGHT: 209.69 LBS | SYSTOLIC BLOOD PRESSURE: 136 MMHG | HEIGHT: 64 IN | HEART RATE: 91 BPM | BODY MASS INDEX: 35.8 KG/M2 | DIASTOLIC BLOOD PRESSURE: 97 MMHG

## 2024-03-08 DIAGNOSIS — K58.0 IRRITABLE BOWEL SYNDROME WITH DIARRHEA: Primary | ICD-10-CM

## 2024-03-08 DIAGNOSIS — K21.9 GASTROESOPHAGEAL REFLUX DISEASE, UNSPECIFIED WHETHER ESOPHAGITIS PRESENT: ICD-10-CM

## 2024-03-08 DIAGNOSIS — K58.0 IRRITABLE BOWEL SYNDROME WITH DIARRHEA: ICD-10-CM

## 2024-03-08 DIAGNOSIS — R14.0 ABDOMINAL BLOATING: ICD-10-CM

## 2024-03-08 DIAGNOSIS — K56.600 PARTIAL INTESTINAL OBSTRUCTION, UNSPECIFIED CAUSE: Primary | ICD-10-CM

## 2024-03-08 DIAGNOSIS — K21.9 GASTROESOPHAGEAL REFLUX DISEASE, UNSPECIFIED WHETHER ESOPHAGITIS PRESENT: Primary | ICD-10-CM

## 2024-03-08 LAB — CRP SERPL-MCNC: 21.5 MG/L (ref 0–8.2)

## 2024-03-08 PROCEDURE — 86140 C-REACTIVE PROTEIN: CPT | Performed by: INTERNAL MEDICINE

## 2024-03-08 PROCEDURE — 99999 PR PBB SHADOW E&M-EST. PATIENT-LVL III: CPT | Mod: PBBFAC,,, | Performed by: INTERNAL MEDICINE

## 2024-03-08 PROCEDURE — 36415 COLL VENOUS BLD VENIPUNCTURE: CPT | Performed by: INTERNAL MEDICINE

## 2024-03-08 PROCEDURE — 99214 OFFICE O/P EST MOD 30 MIN: CPT | Mod: S$GLB,,, | Performed by: INTERNAL MEDICINE

## 2024-03-08 RX ORDER — ONDANSETRON 4 MG/1
4 TABLET, FILM COATED ORAL EVERY 8 HOURS PRN
Qty: 30 TABLET | Refills: 1 | Status: SHIPPED | OUTPATIENT
Start: 2024-03-08

## 2024-03-08 NOTE — TELEPHONE ENCOUNTER
Spoke to [facility] to schedule procedure(s) Upper Endoscopy (EGD)       Physician to perform procedure(s) Dr. MEGAN Owens  Date of Procedure (s) 4/18/24  Arrival Time 7:00 AM  Time of Procedure(s) 8:00 AM   Location of Procedure(s) [facility] 2nd Floor--  Enter at the rear of the building through the emergency department screening station or the Outpatient Registration door, then continue to endoscopy department on the 2nd floor.    Type of Rx Prep sent to patient: Other  Instructions provided to patient via [facility]    Patient was informed on the following information and verbalized understanding. Screening questionnaire reviewed with patient and complete. If procedure requires anesthesia, a responsible adult needs to be present to accompany the patient home, patient cannot drive after receiving anesthesia.If procedure requires anesthesia, a responsible adult needs to be present to accompany the patient home, patient cannot drive after receiving anesthesia. Appointment details are tentative, especially check-in time. Patient will receive a prep-op call 7 days prior to confirm check-in time for procedure. If applicable the patient should contact their pharmacy to verify Rx for procedure prep is ready for pick-up. Patient was advised to call the scheduling department at [phone] if pharmacy states no Rx is available. Patient was advised to call the endoscopy scheduling department if any questions or concerns arise.       Endoscopy Scheduling Department

## 2024-03-08 NOTE — PROGRESS NOTES
"GENERAL GI PATIENT INTAKE:    COVID symptoms in the last 7 days (runny nose, sore throat, congestion, cough, fever): No  PCP: Noemi Marsh  If not PCP-  number given to establish [phone]: N/A    ALLERGIES REVIEWED:  Yes    CHIEF COMPLAINT:    Chief Complaint   Patient presents with    Irritable Bowel Syndrome       VITAL SIGNS:  BP (!) 136/97   Pulse 91   Ht 5' 4" (1.626 m)   Wt 95.1 kg (209 lb 10.5 oz)   BMI 35.99 kg/m²      Change in medical, surgical, family or social history: No      REVIEWED MEDICATION LIST RECONCILED INCLUDING ABOVE MEDS:  Yes     "

## 2024-03-08 NOTE — TELEPHONE ENCOUNTER
"----- Message from Anand Owens MD sent at 3/8/2024 12:57 PM CST -----  Procedure: EGD    Diagnosis: GERD    Procedure Timin-4 weeks    #If within 4 weeks selected, please franchesca as high priority#    #If greater than 12 weeks, please select "4-12 weeks" and delay sending until 2 months prior to requested date#     Provider: Myself    Location: No Preference    Additional Scheduling Information: No scheduling concerns    Prep Specifications:N/A    Have you attached a patient to this message: yes      "

## 2024-03-08 NOTE — PROGRESS NOTES
[facility] Consultation Note    Reason for Consult:  The primary encounter diagnosis was Irritable bowel syndrome with diarrhea. Diagnoses of Abdominal bloating and Gastroesophageal reflux disease, unspecified whether esophagitis present were also pertinent to this visit.    PCP:   Noemi Marsh       Referring MD:  No referring provider defined for this encounter.    Initial History of Present Illness (HPI):  This is a 28 y.o. female here for evaluation of IBS-D, had been seeing Dr Staley  Thinks she has IBS  Tried probiotics for 30 days but was under a lot of stress  Did cut out garlic and onions which has reduced    Symptoms started around 2020 - big stressors at that time  Lots of fatigue at that time  Now with better job - decreasing prozac  But still with significant bloating, frequent diarrhea    Drinks water and ginger ale to help settle things down    Takes nexium OTC +/- tums    Interval History 03/08/2024  Had 3 random episode of vomiting a few hours after eating  Associated with diarrhea  Tried bentyl with some relief of pain    Had some right sided pain after running a marathon, not better with a muscle relaxer, lasted a week    ROS:  Constitutional: No fevers, chills, No weight loss  ENT: No allergies  CV: No chest pain  Pulm: No cough, No shortness of breath  Ophtho: No vision changes  GI: see HPI  Derm: No rash  Heme: No lymphadenopathy, No bruising  MSK: No arthritis  : No dysuria, No hematuria  Endo: No hot or cold intolerance  Neuro: No syncope, No seizure  Psych: No anxiety, No depression    Medical History:  has a past medical history of Acid reflux, Counseling for HPV (human papillomavirus) vaccination (2020), Dysmenorrhea, and Uses vaginal contraceptive ring.    Surgical History:  has a past surgical history that includes [place] tooth extraction and Foot surgery (Left).    Review of patient's allergies indicates:  No Known Allergies    Medication List with  "Changes/Refills   Current Medications    DICYCLOMINE (BENTYL) 10 MG CAPSULE    Take 1 capsule (10 mg total) by mouth before meals as needed (abdominal pain).    ETONOGESTREL-ETHINYL ESTRADIOL (NUVARING) 0.12-0.015 MG/24 HR VAGINAL RING    INSERT ONE RING VAGINALLY  EVERY 21 DAYS CONTINUOUSLY.    FLUOXETINE 10 MG CAPSULE    Take 1 capsule (10 mg total) by mouth once daily.         Objective Findings:    Vital Signs:  BP (!) 136/97   Pulse 91   Ht 5' 4" (1.626 m)   Wt 95.1 kg (209 lb 10.5 oz)   BMI 35.99 kg/m²   Body mass index is 35.99 kg/m².    Physical Exam:  General Appearance: Well appearing in no acute distress  Head:   Normocephalic, without obvious abnormality  Eyes:    No scleral icterus, EOMI  ENT: Neck supple, Lips, mucosa, and tongue normal; teeth and gums normal  Lungs: CTA bilaterally in anterior and posterior fields, no wheezes, no crackles.  Heart:  Regular rate and rhythm, S1, S2 normal, no murmurs heard  Abdomen: Soft, non tender, non distended with positive bowel sounds in all four quadrants. No hepatosplenomegaly, ascites, or mass  Extremities: 2+ pulses, no clubbing, cyanosis or edema  Skin: No rash  Neurologic: CN II-XII intact      Labs:  Lab Results   Component Value Date    WBC 8.33 09/22/2023    HGB 12.4 09/22/2023    HCT 39.6 09/22/2023     09/22/2023    CHOL 203 (H) 09/22/2023    TRIG 220 (H) 09/22/2023    HDL 48 09/22/2023    ALT 30 09/22/2023    AST 26 09/22/2023     09/22/2023    K 4.4 09/22/2023     09/22/2023    CREATININE 0.7 09/22/2023    BUN 7 09/22/2023    CO2 20 (L) 09/22/2023    TSH 1.785 09/22/2023    HGBA1C 5.7 (H) 09/22/2023       No results found for: "HPYLORINTERP"  No results found for: "HPYLORIANTIG"    Stool samples - wnl  Outside labs - wnl, CRP 12    Imaging:    Endoscopy:    Colon 21 - wnl      Assessment:  1. Irritable bowel syndrome with diarrhea    2. Abdominal bloating    3. Gastroesophageal reflux disease, unspecified whether esophagitis " present             Recommendations:  1. Check celiac testing - wnl, Recheck CRP as it was elevated outside  2. Trial of fodzyme  3. Bentyl prn - helps some  4. Go to xifaxan   5. EGD    Could get ultrasound for GB if no improvements    No follow-ups on file.      Order summary:           Thank you so much for allowing me to participate in the care of Kailee Owens MD

## 2024-03-12 ENCOUNTER — HOSPITAL ENCOUNTER (OUTPATIENT)
Dept: RADIOLOGY | Facility: HOSPITAL | Age: 29
Discharge: HOME | End: 2024-03-12
Attending: INTERNAL MEDICINE
Payer: COMMERCIAL

## 2024-03-12 DIAGNOSIS — K56.600 PARTIAL INTESTINAL OBSTRUCTION, UNSPECIFIED CAUSE: ICD-10-CM

## 2024-03-12 PROCEDURE — 74177 CT ABD & PELVIS W/CONTRAST: CPT | Mod: TC

## 2024-03-12 PROCEDURE — A9698 NON-RAD CONTRAST MATERIALNOC: HCPCS | Performed by: INTERNAL MEDICINE

## 2024-03-12 PROCEDURE — 25500020 PHARM REV CODE 255: Performed by: INTERNAL MEDICINE

## 2024-03-12 PROCEDURE — 74177 CT ABD & PELVIS W/CONTRAST: CPT | Mod: 26,,, | Performed by: RADIOLOGY

## 2024-03-12 RX ADMIN — IOHEXOL 100 ML: 350 INJECTION, SOLUTION INTRAVENOUS at 07:03

## 2024-03-12 RX ADMIN — BARIUM SULFATE 1350 ML: 1 SUSPENSION ORAL at 07:03

## 2024-03-13 ENCOUNTER — PATIENT MESSAGE (OUTPATIENT)
Dept: GASTROENTEROLOGY | Facility: CLINIC | Age: 29
End: 2024-03-13
Payer: COMMERCIAL

## 2024-03-13 DIAGNOSIS — R16.0 LIVER MASS, RIGHT LOBE: Primary | ICD-10-CM

## 2024-04-11 ENCOUNTER — TELEPHONE (OUTPATIENT)
Dept: ENDOSCOPY | Facility: HOSPITAL | Age: 29
End: 2024-04-11
Payer: COMMERCIAL

## 2024-04-17 ENCOUNTER — ANESTHESIA EVENT (OUTPATIENT)
Dept: ENDOSCOPY | Facility: HOSPITAL | Age: 29
End: 2024-04-17
Payer: COMMERCIAL

## 2024-04-17 RX ORDER — LIDOCAINE HYDROCHLORIDE 10 MG/ML
1 INJECTION, SOLUTION EPIDURAL; INFILTRATION; INTRACAUDAL; PERINEURAL ONCE
Status: CANCELLED | OUTPATIENT
Start: 2024-04-17 | End: 2024-04-17

## 2024-04-18 ENCOUNTER — ANESTHESIA (OUTPATIENT)
Dept: ENDOSCOPY | Facility: HOSPITAL | Age: 29
End: 2024-04-18
Payer: COMMERCIAL

## 2024-04-18 ENCOUNTER — HOSPITAL ENCOUNTER (OUTPATIENT)
Facility: HOSPITAL | Age: 29
Discharge: HOME | End: 2024-04-18
Attending: INTERNAL MEDICINE | Admitting: INTERNAL MEDICINE
Payer: COMMERCIAL

## 2024-04-18 VITALS
HEART RATE: 76 BPM | SYSTOLIC BLOOD PRESSURE: 116 MMHG | TEMPERATURE: 98 F | OXYGEN SATURATION: 97 % | DIASTOLIC BLOOD PRESSURE: 75 MMHG | RESPIRATION RATE: 17 BRPM

## 2024-04-18 DIAGNOSIS — K21.9 GASTROESOPHAGEAL REFLUX: ICD-10-CM

## 2024-04-18 LAB
B-HCG UR QL: NEGATIVE
CTP QC/QA: YES

## 2024-04-18 PROCEDURE — 43239 EGD BIOPSY SINGLE/MULTIPLE: CPT | Mod: ,,, | Performed by: INTERNAL MEDICINE

## 2024-04-18 PROCEDURE — 25000003 PHARM REV CODE 250: Performed by: STUDENT IN AN ORGANIZED HEALTH CARE EDUCATION/TRAINING PROGRAM

## 2024-04-18 PROCEDURE — 37000009 HC ANESTHESIA EA ADD 15 MINS: Performed by: INTERNAL MEDICINE

## 2024-04-18 PROCEDURE — 63600175 PHARM REV CODE 636 W HCPCS: Performed by: STUDENT IN AN ORGANIZED HEALTH CARE EDUCATION/TRAINING PROGRAM

## 2024-04-18 PROCEDURE — 25000003 PHARM REV CODE 250: Performed by: ANESTHESIOLOGY

## 2024-04-18 PROCEDURE — 81025 URINE PREGNANCY TEST: CPT | Performed by: INTERNAL MEDICINE

## 2024-04-18 PROCEDURE — 88305 TISSUE EXAM BY PATHOLOGIST: CPT | Mod: 59 | Performed by: PATHOLOGY

## 2024-04-18 PROCEDURE — 43239 EGD BIOPSY SINGLE/MULTIPLE: CPT | Performed by: INTERNAL MEDICINE

## 2024-04-18 PROCEDURE — 37000008 HC ANESTHESIA 1ST 15 MINUTES: Performed by: INTERNAL MEDICINE

## 2024-04-18 PROCEDURE — D9220A PRA ANESTHESIA: Mod: CRNA,,, | Performed by: STUDENT IN AN ORGANIZED HEALTH CARE EDUCATION/TRAINING PROGRAM

## 2024-04-18 PROCEDURE — 88305 TISSUE EXAM BY PATHOLOGIST: CPT | Mod: 26,,, | Performed by: PATHOLOGY

## 2024-04-18 PROCEDURE — D9220A PRA ANESTHESIA: Mod: ANES,,, | Performed by: ANESTHESIOLOGY

## 2024-04-18 RX ORDER — DEXTROMETHORPHAN/PSEUDOEPHED 2.5-7.5/.8
DROPS ORAL
Status: DISCONTINUED
Start: 2024-04-18 | End: 2024-04-18 | Stop reason: HOSPADM

## 2024-04-18 RX ORDER — PROPOFOL 10 MG/ML
VIAL (ML) INTRAVENOUS
Status: COMPLETED
Start: 2024-04-18 | End: 2024-04-18

## 2024-04-18 RX ORDER — PROPOFOL 10 MG/ML
VIAL (ML) INTRAVENOUS
Status: DISCONTINUED | OUTPATIENT
Start: 2024-04-18 | End: 2024-04-18

## 2024-04-18 RX ORDER — LIDOCAINE HYDROCHLORIDE 20 MG/ML
INJECTION INTRAVENOUS
Status: DISCONTINUED | OUTPATIENT
Start: 2024-04-18 | End: 2024-04-18

## 2024-04-18 RX ORDER — PROPOFOL 10 MG/ML
VIAL (ML) INTRAVENOUS
Status: DISCONTINUED
Start: 2024-04-18 | End: 2024-04-18 | Stop reason: HOSPADM

## 2024-04-18 RX ORDER — PANTOPRAZOLE SODIUM 40 MG/1
40 TABLET, DELAYED RELEASE ORAL DAILY
Status: DISCONTINUED | OUTPATIENT
Start: 2024-04-18 | End: 2024-04-18 | Stop reason: HOSPADM

## 2024-04-18 RX ORDER — LIDOCAINE HYDROCHLORIDE 20 MG/ML
INJECTION, SOLUTION EPIDURAL; INFILTRATION; INTRACAUDAL; PERINEURAL
Status: DISCONTINUED
Start: 2024-04-18 | End: 2024-04-18 | Stop reason: HOSPADM

## 2024-04-18 RX ORDER — SODIUM CHLORIDE 9 MG/ML
INJECTION, SOLUTION INTRAVENOUS CONTINUOUS
Status: DISCONTINUED | OUTPATIENT
Start: 2024-04-18 | End: 2024-04-18 | Stop reason: HOSPADM

## 2024-04-18 RX ADMIN — PROPOFOL 140 MG: 10 INJECTION, EMULSION INTRAVENOUS at 08:04

## 2024-04-18 RX ADMIN — PROPOFOL 100 MG: 10 INJECTION, EMULSION INTRAVENOUS at 08:04

## 2024-04-18 RX ADMIN — LIDOCAINE HYDROCHLORIDE 100 MG: 20 INJECTION, SOLUTION INTRAVENOUS at 08:04

## 2024-04-18 RX ADMIN — SODIUM CHLORIDE: 0.9 INJECTION, SOLUTION INTRAVENOUS at 07:04

## 2024-04-18 NOTE — PLAN OF CARE
Procedure and recovery complete. Patient awake and alert. MD and family at bedside, procedure findings and suggestions discussed. Discharge instructions given, patient verbalized understanding of instructions. Gait steady able to ambulate without assistance. Patient walked out accompanied by family member.

## 2024-04-18 NOTE — H&P
Short Stay Endoscopy History and Physical    PCP - Noemi Marsh MD     Procedure - EGD  ASA - per anesthesia  Mallampati - per anesthesia  History of Anesthesia problems - no  Family history Anesthesia problems -  no   Plan of anesthesia - General    HPI:  This is a 28 y.o. female here for evaluation of :     abdominal pain      ROS:  Constitutional: No fevers, chills, No weight loss  CV: No chest pain  Pulm: No cough, No shortness of breath  Ophtho: No vision changes  GI: see HPI  Derm: No rash    Medical History:  has a past medical history of Acid reflux, Counseling for HPV (human papillomavirus) vaccination (2020), Dysmenorrhea, and Uses vaginal contraceptive ring.    Surgical History:  has a past surgical history that includes [place] tooth extraction and Foot surgery (Left).    Family History: family history includes Alcohol abuse in her mother; Breast cancer in her paternal aunt; Cancer in her paternal uncle; Diabetes type II in her paternal grandfather; Heart disease in her paternal grandfather; Hyperlipidemia in her mother; Hypertension in her father, paternal grandfather, paternal grandmother, and sister.. Otherwise no colon cancer, inflammatory bowel disease, or GI malignancies.    Social History:  reports that she has never smoked. She has never used smokeless tobacco. She reports current alcohol use of about 6.0 standard drinks of alcohol per week. She reports that she does not use drugs.    Review of patient's allergies indicates:  No Known Allergies    Medications:   Medications Prior to Admission   Medication Sig Dispense Refill Last Dose    etonogestreL-ethinyl estradioL (NUVARING) 0.12-0.015 mg/24 hr vaginal ring INSERT ONE RING VAGINALLY  EVERY 21 DAYS CONTINUOUSLY. 3 each 3 4/18/2024    dicyclomine (BENTYL) 10 MG capsule Take 1 capsule (10 mg total) by mouth before meals as needed (abdominal pain). 90 capsule 3     ondansetron (ZOFRAN) 4 MG tablet Take 1 tablet (4 mg total) by mouth  every 8 (eight) hours as needed for Nausea. 30 tablet 1        Physical Exam:    Vital Signs:   Vitals:    04/18/24 0729   BP: 126/79   Pulse: 91   Resp: 16   Temp: 98 °F (36.7 °C)       General Appearance: Well appearing in no acute distress  Eyes:    No scleral icterus  ENT: Neck supple, Lips, mucosa, and tongue normal; teeth and gums normal  Abdomen: Soft, non tender, non distended with normal bowel sounds. No hepatosplenomegaly, ascites, or mass.  Extremities: No edema  Skin: No rash    Labs:  Lab Results   Component Value Date    WBC 8.33 09/22/2023    HGB 12.4 09/22/2023    HCT 39.6 09/22/2023     09/22/2023    CHOL 203 (H) 09/22/2023    TRIG 220 (H) 09/22/2023    HDL 48 09/22/2023    ALT 30 09/22/2023    AST 26 09/22/2023     09/22/2023    K 4.4 09/22/2023     09/22/2023    CREATININE 0.7 09/22/2023    BUN 7 09/22/2023    CO2 20 (L) 09/22/2023    TSH 1.785 09/22/2023    HGBA1C 5.7 (H) 09/22/2023       I have explained the risks and benefits of endoscopy procedures to the patient including but not limited to bleeding, perforation, infection, and death.  The patient was asked if they understand and allowed to ask any further questions to their satisfaction.      Anand Owens MD

## 2024-04-18 NOTE — PROVATION PATIENT INSTRUCTIONS
Discharge Summary/Instructions after an Endoscopic Procedure  Patient Name: Kailee Messer  Patient MRN: [id number]  Patient YOB: 1995 Thursday, April 18, 2024  Anand Owens MD  Dear patient,  As a result of recent federal legislation (The Federal Cures Act), you may   receive lab or pathology results from your procedure in your [facility]   account before your physician is able to contact you. Your physician or   their representative will relay the results to you with their   recommendations at their soonest availability.  Thank you,  RESTRICTIONS:  During your procedure today, you received medications for sedation.  These   medications may affect your judgment, balance and coordination.  Therefore,   for 24 hours, you have the following restrictions:   - DO NOT drive a car, operate machinery, make legal/financial decisions,   sign important papers or drink alcohol.    ACTIVITY:  Today: no heavy lifting, straining or running due to procedural   sedation/anesthesia.  The following day: return to full activity including work.  DIET:  Eat and drink normally unless instructed otherwise.     TREATMENT FOR COMMON SIDE EFFECTS:  - Mild abdominal pain, nausea, belching, bloating or excessive gas:  rest,   eat lightly and use a heating pad.  - Sore Throat: treat with throat lozenges and/or gargle with warm salt   water.  - Because air was used during the procedure, expelling large amounts of air   from your rectum or belching is normal.  - If a bowel prep was taken, you may not have a bowel movement for 1-3 days.    This is normal.  SYMPTOMS TO WATCH FOR AND REPORT TO YOUR PHYSICIAN:  1. Abdominal pain or bloating, other than gas cramps.  2. Chest pain.  3. Back pain.  4. Signs of infection such as: chills or fever occurring within 24 hours   after the procedure.  5. Rectal bleeding, which would show as bright red, maroon, or black stools.   (A tablespoon of blood from the rectum is not serious, especially if    hemorrhoids are present.)  6. Vomiting.  7. Weakness or dizziness.  GO DIRECTLY TO THE NEAREST EMERGENCY ROOM IF YOU HAVE ANY OF THE FOLLOWING:      Difficulty breathing              Chills and/or fever over 101 F   Persistent vomiting and/or vomiting blood   Severe abdominal pain   Severe chest pain   Black, tarry stools   Bleeding- more than one tablespoon   Any other symptom or condition that you feel may need urgent attention  Your doctor recommends these additional instructions:  If any biopsies were taken, your doctors clinic will contact you in 1 to 2   weeks with any results.  - Patient has a contact number available for emergencies.  The signs and   symptoms of potential delayed complications were discussed with the   patient.  Return to normal activities tomorrow.  Written discharge   instructions were provided to the patient.   - Discharge patient to home.   - Await pathology results.   - PPI once a day x 8 weeks  - The findings and recommendations were discussed with the designated   responsible adult.  For questions, problems or results please call your physician - Anand Owens MD at Work:  [phone].  [facility] can be reached at [phone]     IF A COMPLICATION OR EMERGENCY SITUATION ARISES AND YOU ARE UNABLE TO REACH   YOUR PHYSICIAN - GO DIRECTLY TO THE EMERGENCY ROOM.  Anand Owens MD  4/18/2024 8:23:10 AM  This report has been verified and signed electronically.  Dear patient,  As a result of recent federal legislation (The Federal Cures Act), you may   receive lab or pathology results from your procedure in your [facility]   account before your physician is able to contact you. Your physician or   their representative will relay the results to you with their   recommendations at their soonest availability.  Thank you,  PROVATION

## 2024-04-18 NOTE — TRANSFER OF CARE
Anesthesia Transfer of Care Note    Patient: Kailee Messer    Procedure(s) Performed: Procedure(s) (LRB):  EGD (ESOPHAGOGASTRODUODENOSCOPY) (N/A)    Patient location: GI    Anesthesia Type: MAC    Transport from OR: Transported from OR on 2-3 L/min O2 by NC with adequate spontaneous ventilation    Post pain: adequate analgesia    Post assessment: no apparent anesthetic complications    Post vital signs: stable    Level of consciousness: awake    Complications: none    Transfer of care protocol was followed      Last vitals: Visit Vitals  /70 (BP Location: Right arm, Patient Position: Lying)   Pulse 89   Temp 36.7 °C (98 °F) (Temporal)   Resp 19   LMP 03/10/2024 (Approximate)   SpO2 96%   Breastfeeding No

## 2024-04-18 NOTE — ANESTHESIA PREPROCEDURE EVALUATION
04/18/2024  Kailee Messer is a 28 y.o., female.      Pre-op Assessment    I have reviewed the Patient Summary Reports.     I have reviewed the Nursing Notes.    I have reviewed the Medications.     Review of Systems  Anesthesia Hx:  No problems with previous Anesthesia   Neg history of prior surgery.          Denies Family Hx of Anesthesia complications.    Denies Personal Hx of Anesthesia complications.                    Social:  Non-Smoker, No Alcohol Use       Hematology/Oncology:  Hematology Normal   Oncology Normal                                   EENT/Dental:  EENT/Dental Normal           Cardiovascular:  Cardiovascular Normal Exercise tolerance: good                                           Pulmonary:  Pulmonary Normal                       Renal/:  Renal/ Normal                 Hepatic/GI:     GERD             Musculoskeletal:  Musculoskeletal Normal                Neurological:  Neurology Normal                                      Endocrine:  Endocrine Normal            Dermatological:  Skin Normal    Psych:  Psychiatric History                  Physical Exam  General: Well nourished    Airway:  Mallampati: II   Mouth Opening: Normal  Tongue: Normal  Neck ROM: Normal ROM    Dental:  Intact    Chest/Lungs:  Clear to auscultation    Heart:  Rate: Normal  Rhythm: Regular Rhythm  Sounds: Normal        Anesthesia Plan  Type of Anesthesia, risks & benefits discussed:    Anesthesia Type: Gen Natural Airway  Intra-op Monitoring Plan: Standard ASA Monitors  Induction:  IV  Informed Consent: Informed consent signed with the Patient and all parties understand the risks and agree with anesthesia plan.  All questions answered. Patient consented to blood products? Yes  ASA Score: 2    Ready For Surgery From Anesthesia Perspective.     .

## 2024-04-18 NOTE — ANESTHESIA POSTPROCEDURE EVALUATION
Anesthesia Post Evaluation    Patient: Kailee Messer    Procedure(s) Performed: Procedure(s) (LRB):  EGD (ESOPHAGOGASTRODUODENOSCOPY) (N/A)    Final Anesthesia Type: general      Patient location during evaluation: GI PACU  Patient participation: Yes- Able to Participate  Level of consciousness: awake and alert, oriented and awake  Post-procedure vital signs: reviewed and stable  Airway patency: patent    PONV status at discharge: No PONV  Anesthetic complications: no      Cardiovascular status: blood pressure returned to baseline  Respiratory status: unassisted, spontaneous ventilation and room air  Hydration status: euvolemic  Follow-up not needed.              Vitals Value Taken Time   /75 04/18/24 0850   Temp 36.7 °C (98 °F) 04/18/24 0820   Pulse 76 04/18/24 0850   Resp 17 04/18/24 0850   SpO2 97 % 04/18/24 0850         Event Time   Out of Recovery 08:53:05         Pain/Gladis Score: Gladis Score: 10 (4/18/2024  8:50 AM)

## 2024-04-22 LAB
FINAL PATHOLOGIC DIAGNOSIS: NORMAL
GROSS: NORMAL
Lab: NORMAL

## 2024-04-23 ENCOUNTER — PATIENT MESSAGE (OUTPATIENT)
Dept: GASTROENTEROLOGY | Facility: CLINIC | Age: 29
End: 2024-04-23
Payer: COMMERCIAL

## 2024-04-23 LAB
FINAL PATHOLOGIC DIAGNOSIS: NORMAL
GROSS: NORMAL
Lab: NORMAL

## 2024-04-23 RX ORDER — PANTOPRAZOLE SODIUM 40 MG/1
40 TABLET, DELAYED RELEASE ORAL DAILY
Qty: 30 TABLET | Refills: 3 | Status: SHIPPED | OUTPATIENT
Start: 2024-04-23

## 2024-04-29 NOTE — PROGRESS NOTES
[facility] Consultation Note    Reason for Consult:  The primary encounter diagnosis was Liver mass. Diagnoses of Liver disease, unspecified and Liver mass, right lobe were also pertinent to this visit.    PCP: Noemi Marsh   [id number] [place] [id number]    HPI:  This is a 28 y.o. female here for evaluation of: CT scan results - liver mass     3/12/24  CT enterography abd pelvis with contrast, done for IBS:    Liver: Hepatic steatosis, normal size liver, Heterogenously enhancing mass within the right lobe of the liver measuring 4.3 x 3.6 cm.     GB normal, bile ducts non-dilated. Spleen normal size.     MRI with liver mass protocol is recommended by the radiologist.       AFP: None available.    LFTs on 9/22/23: all normal.     On birth control vaginal ring (generic nuvaring every 3 weeks), since 2012.          Elevated liver enzymes: No labs available  Abnormal imaging: Yes - 4.6 cm liver mass  Cirrhosis: No  Hepatitis C: No  Hepatitis B: No  Fatty liver: Yes  Encephalopathy: No  Post-hospital discharge: No  Symptoms: none related to liver mass.     Primary hepatic manifestations:  Fatigue:No  Edema:No  Ascites:No  Encephalopathy:No  Abdominal pain:No  GI bleeds: No  Pruritus:No  Weight Changes:No  Changes in Bowel habits: No  Muscle cramps:No    Risk factors for liver disease:  No jaundice  No transfusions  No IVDU  Did not snort cocaine or similar agents  Did not live with anyone with hepatitis B or C  Sexual partner not tested  No hepatotoxic medications  No exposure to industrial toxins  Alcohol:       ROS:  Constitutional: No fevers, chills, weight changes, fatigue  ENT: No allergies, nosebleeds,   CV: No chest pain  Pulm: No cough, shortness of breath  Ophtho: No vision changes  GI/Liver: see HPI  Derm: No rash, itching  Heme: No swollen glands, bruising  MSK: No joint pains, joint swelling  : No dysuria, hematuria, decrease in urine output  Endo: No hot or cold  "intolerance  Neuro: No confusion, disorientation, difficulty with sleep, memory, concentration, syncope, seizure  Psych: No anxiety, depression    Medical History:  has a past medical history of Acid reflux, Counseling for HPV (human papillomavirus) vaccination (2020), Dysmenorrhea, and Uses vaginal contraceptive ring.    Surgical History:  has a past surgical history that includes [place] tooth extraction; Foot surgery (Left); and Esophagogastroduodenoscopy (N/A, 4/18/2024).    Family History: family history includes Alcohol abuse in her mother; Breast cancer in her paternal aunt; Cancer in her paternal uncle; Diabetes type II in her paternal grandfather; Heart disease in her paternal grandfather; Hyperlipidemia in her mother; Hypertension in her father, paternal grandfather, paternal grandmother, and sister..     Social History:  reports that she has never smoked. She has never used smokeless tobacco. She reports current alcohol use of about 6.0 standard drinks of alcohol per week. She reports that she does not use drugs.    Review of patient's allergies indicates:  No Known Allergies    Current Outpatient Rx   Medication Sig Dispense Refill    dicyclomine (BENTYL) 10 MG capsule Take 1 capsule (10 mg total) by mouth before meals as needed (abdominal pain). 90 capsule 3    etonogestreL-ethinyl estradioL (NUVARING) 0.12-0.015 mg/24 hr vaginal ring INSERT ONE RING VAGINALLY  EVERY 21 DAYS CONTINUOUSLY. 3 each 3    ondansetron (ZOFRAN) 4 MG tablet Take 1 tablet (4 mg total) by mouth every 8 (eight) hours as needed for Nausea. 30 tablet 1    pantoprazole (PROTONIX) 40 MG tablet Take 1 tablet (40 mg total) by mouth once daily. 30 tablet 3       Objective Findings:    Vital Signs:  BP (!) 143/89 (BP Location: Right arm, Patient Position: Sitting, BP Method: Large (Automatic))   Pulse 87   Temp 98.2 °F (36.8 °C) (Oral)   Ht 5' 4" (1.626 m)   Wt 93.8 kg (206 lb 12.7 oz)   SpO2 98% Comment: RA  BMI 35.50 kg/m²   Body " mass index is 35.5 kg/m².    Physical Exam:  General Appearance: Well appearing in no acute distress  Head:   Normocephalic, without obvious abnormality  Eyes:    No scleral icterus, EOMI  ENT: Neck supple,   Abdomen: Soft, non tender, non distended with positive bowel sounds in all four quadrants. No hepatosplenomegaly, ascites, or mass  Extremities:  no clubbing, cyanosis or edema  Skin: No rash  Neurologic: Alert, oriented x 3.       Labs:  Lab Results   Component Value Date    WBC 8.33 09/22/2023    HGB 12.4 09/22/2023    HCT 39.6 09/22/2023     09/22/2023    CHOL 203 (H) 09/22/2023    TRIG 220 (H) 09/22/2023    HDL 48 09/22/2023    CREATININE 0.7 09/22/2023    BUN 7 09/22/2023    BILITOT 0.4 09/22/2023    ALT 30 09/22/2023    AST 26 09/22/2023    ALKPHOS 63 09/22/2023     09/22/2023    K 4.4 09/22/2023     09/22/2023    CO2 20 (L) 09/22/2023    TSH 1.785 09/22/2023    HGBA1C 5.7 (H) 09/22/2023         Current Meds:  Current Outpatient Medications   Medication Sig Dispense Refill    dicyclomine (BENTYL) 10 MG capsule Take 1 capsule (10 mg total) by mouth before meals as needed (abdominal pain). 90 capsule 3    etonogestreL-ethinyl estradioL (NUVARING) 0.12-0.015 mg/24 hr vaginal ring INSERT ONE RING VAGINALLY  EVERY 21 DAYS CONTINUOUSLY. 3 each 3    ondansetron (ZOFRAN) 4 MG tablet Take 1 tablet (4 mg total) by mouth every 8 (eight) hours as needed for Nausea. 30 tablet 1    pantoprazole (PROTONIX) 40 MG tablet Take 1 tablet (40 mg total) by mouth once daily. 30 tablet 3     No current facility-administered medications for this visit.        Imaging:       Endoscopy:      Assessment:  1. Liver mass    2. Liver disease, unspecified    3. Liver mass, right lobe       Liver mass, seen on CT, characterization not available, radiologist recommended MRI w wo contrast, using liver mass protocol.     Recommendations:  -  Labs today:  CBC, CMP, PT INR, AFP, CA 19-9  -  MRI abd w wo contrast using liver  mass protocol. Please schedule at main campus.  -  Avoid alcohol, smoking, sedatives and meds with codeine.  -  Avoid high intake of Tylenol (more than 4 extra-strength pills in one day)  -  Call us if any bleeding, fevers, confusion, disorientation occur  -  Transplant option discussed, will evaluate if lesion malignant.  -  Education provided: liver disease, cirrhosis, HCC, monitoring and follow-up.   -  Return in 1 month.      Follow up in about 1 month (around 5/30/2024).      Order summary:  Orders Placed This Encounter   Procedures    MRI Abdomen W WO Contrast    AFP Tumor Marker    CBC Auto Differential    Comprehensive Metabolic Panel    Protime-INR    CANCER ANTIGEN 19-9       Thank you so much for allowing me to participate in the care of Kaileeyeimi Grewal MD

## 2024-04-30 ENCOUNTER — LAB VISIT (OUTPATIENT)
Dept: LAB | Facility: HOSPITAL | Age: 29
End: 2024-04-30
Attending: INTERNAL MEDICINE
Payer: COMMERCIAL

## 2024-04-30 ENCOUNTER — OFFICE VISIT (OUTPATIENT)
Dept: HEPATOLOGY | Facility: CLINIC | Age: 29
End: 2024-04-30
Payer: COMMERCIAL

## 2024-04-30 ENCOUNTER — TELEPHONE (OUTPATIENT)
Dept: HEPATOLOGY | Facility: CLINIC | Age: 29
End: 2024-04-30
Payer: COMMERCIAL

## 2024-04-30 VITALS
OXYGEN SATURATION: 98 % | HEIGHT: 64 IN | SYSTOLIC BLOOD PRESSURE: 143 MMHG | BODY MASS INDEX: 35.31 KG/M2 | HEART RATE: 87 BPM | TEMPERATURE: 98 F | DIASTOLIC BLOOD PRESSURE: 89 MMHG | WEIGHT: 206.81 LBS

## 2024-04-30 DIAGNOSIS — R16.0 LIVER MASS: ICD-10-CM

## 2024-04-30 DIAGNOSIS — K76.9 LIVER DISEASE, UNSPECIFIED: ICD-10-CM

## 2024-04-30 DIAGNOSIS — R16.0 LIVER MASS: Primary | ICD-10-CM

## 2024-04-30 DIAGNOSIS — R16.0 LIVER MASS, RIGHT LOBE: ICD-10-CM

## 2024-04-30 LAB
AFP SERPL-MCNC: <2 NG/ML (ref 0–8.4)
ALBUMIN SERPL BCP-MCNC: 3.9 G/DL (ref 3.5–5.2)
ALP SERPL-CCNC: 83 U/L (ref 55–135)
ALT SERPL W/O P-5'-P-CCNC: 69 U/L (ref 10–44)
ANION GAP SERPL CALC-SCNC: 10 MMOL/L (ref 8–16)
AST SERPL-CCNC: 46 U/L (ref 10–40)
BASOPHILS # BLD AUTO: 0.07 K/UL (ref 0–0.2)
BASOPHILS NFR BLD: 0.9 % (ref 0–1.9)
BILIRUB SERPL-MCNC: 0.4 MG/DL (ref 0.1–1)
BUN SERPL-MCNC: 6 MG/DL (ref 6–20)
CALCIUM SERPL-MCNC: 9.7 MG/DL (ref 8.7–10.5)
CANCER AG19-9 SERPL-ACNC: 14.4 U/ML (ref 0–40)
CHLORIDE SERPL-SCNC: 105 MMOL/L (ref 95–110)
CO2 SERPL-SCNC: 25 MMOL/L (ref 23–29)
CREAT SERPL-MCNC: 0.7 MG/DL (ref 0.5–1.4)
DIFFERENTIAL METHOD BLD: ABNORMAL
EOSINOPHIL # BLD AUTO: 0.2 K/UL (ref 0–0.5)
EOSINOPHIL NFR BLD: 2.1 % (ref 0–8)
ERYTHROCYTE [DISTWIDTH] IN BLOOD BY AUTOMATED COUNT: 13.9 % (ref 11.5–14.5)
EST. GFR  (NO RACE VARIABLE): >60 ML/MIN/1.73 M^2
GLUCOSE SERPL-MCNC: 94 MG/DL (ref 70–110)
HCT VFR BLD AUTO: 40.8 % (ref 37–48.5)
HGB BLD-MCNC: 12.9 G/DL (ref 12–16)
IMM GRANULOCYTES # BLD AUTO: 0.03 K/UL (ref 0–0.04)
IMM GRANULOCYTES NFR BLD AUTO: 0.4 % (ref 0–0.5)
INR PPP: 1 (ref 0.8–1.2)
LYMPHOCYTES # BLD AUTO: 2.7 K/UL (ref 1–4.8)
LYMPHOCYTES NFR BLD: 34.5 % (ref 18–48)
MCH RBC QN AUTO: 26.8 PG (ref 27–31)
MCHC RBC AUTO-ENTMCNC: 31.6 G/DL (ref 32–36)
MCV RBC AUTO: 85 FL (ref 82–98)
MONOCYTES # BLD AUTO: 0.5 K/UL (ref 0.3–1)
MONOCYTES NFR BLD: 6.9 % (ref 4–15)
NEUTROPHILS # BLD AUTO: 4.3 K/UL (ref 1.8–7.7)
NEUTROPHILS NFR BLD: 55.2 % (ref 38–73)
NRBC BLD-RTO: 0 /100 WBC
PLATELET # BLD AUTO: 332 K/UL (ref 150–450)
PMV BLD AUTO: 9.6 FL (ref 9.2–12.9)
POTASSIUM SERPL-SCNC: 4.2 MMOL/L (ref 3.5–5.1)
PROT SERPL-MCNC: 7.9 G/DL (ref 6–8.4)
PROTHROMBIN TIME: 10.8 SEC (ref 9–12.5)
RBC # BLD AUTO: 4.81 M/UL (ref 4–5.4)
SODIUM SERPL-SCNC: 140 MMOL/L (ref 136–145)
WBC # BLD AUTO: 7.79 K/UL (ref 3.9–12.7)

## 2024-04-30 PROCEDURE — 82105 ALPHA-FETOPROTEIN SERUM: CPT | Performed by: INTERNAL MEDICINE

## 2024-04-30 PROCEDURE — 36415 COLL VENOUS BLD VENIPUNCTURE: CPT | Performed by: INTERNAL MEDICINE

## 2024-04-30 PROCEDURE — 80053 COMPREHEN METABOLIC PANEL: CPT | Performed by: INTERNAL MEDICINE

## 2024-04-30 PROCEDURE — 85025 COMPLETE CBC W/AUTO DIFF WBC: CPT | Performed by: INTERNAL MEDICINE

## 2024-04-30 PROCEDURE — 85610 PROTHROMBIN TIME: CPT | Performed by: INTERNAL MEDICINE

## 2024-04-30 PROCEDURE — 99999 PR PBB SHADOW E&M-EST. PATIENT-LVL IV: CPT | Mod: PBBFAC,,, | Performed by: INTERNAL MEDICINE

## 2024-04-30 PROCEDURE — 86301 IMMUNOASSAY TUMOR CA 19-9: CPT | Performed by: INTERNAL MEDICINE

## 2024-04-30 PROCEDURE — 99205 OFFICE O/P NEW HI 60 MIN: CPT | Mod: S$GLB,,, | Performed by: INTERNAL MEDICINE

## 2024-04-30 NOTE — PATIENT INSTRUCTIONS
Recommendations:  -  Labs today:  CBC, CMP, PT INR, AFP, CA 19-9  -  MRI abd w wo contrast using liver mass protocol. Please schedule at main campus.  -  Avoid alcohol, smoking, sedatives and meds with codeine.  -  Avoid high intake of Tylenol (more than 4 extra-strength pills in one day)  -  Call us if any bleeding, fevers, confusion, disorientation occur  -  Transplant option discussed, will evaluate if lesion malignant.  -  Education provided: liver disease, cirrhosis, HCC, monitoring and follow-up.   -  Return in 1 month.

## 2024-05-13 ENCOUNTER — TELEPHONE (OUTPATIENT)
Dept: HEPATOLOGY | Facility: CLINIC | Age: 29
End: 2024-05-13

## 2024-05-13 ENCOUNTER — PATIENT MESSAGE (OUTPATIENT)
Dept: HEPATOLOGY | Facility: CLINIC | Age: 29
End: 2024-05-13

## 2024-05-13 ENCOUNTER — OFFICE VISIT (OUTPATIENT)
Dept: HEPATOLOGY | Facility: CLINIC | Age: 29
End: 2024-05-13
Payer: COMMERCIAL

## 2024-05-13 ENCOUNTER — LAB VISIT (OUTPATIENT)
Dept: LAB | Facility: HOSPITAL | Age: 29
End: 2024-05-13
Payer: COMMERCIAL

## 2024-05-13 VITALS
HEART RATE: 90 BPM | SYSTOLIC BLOOD PRESSURE: 136 MMHG | OXYGEN SATURATION: 96 % | TEMPERATURE: 98 F | DIASTOLIC BLOOD PRESSURE: 84 MMHG | HEIGHT: 64 IN | WEIGHT: 207 LBS | BODY MASS INDEX: 35.34 KG/M2

## 2024-05-13 DIAGNOSIS — R16.0 LIVER MASS: ICD-10-CM

## 2024-05-13 DIAGNOSIS — R74.8 ELEVATED LIVER ENZYMES: ICD-10-CM

## 2024-05-13 DIAGNOSIS — R74.01 ELEVATED TRANSAMINASE LEVEL: Primary | ICD-10-CM

## 2024-05-13 DIAGNOSIS — K76.9 LIVER DISEASE, UNSPECIFIED: Primary | ICD-10-CM

## 2024-05-13 LAB
AFP SERPL-MCNC: <2 NG/ML (ref 0–8.4)
ALBUMIN SERPL BCP-MCNC: 3.8 G/DL (ref 3.5–5.2)
ALP SERPL-CCNC: 85 U/L (ref 55–135)
ALT SERPL W/O P-5'-P-CCNC: 69 U/L (ref 10–44)
ANION GAP SERPL CALC-SCNC: 15 MMOL/L (ref 8–16)
AST SERPL-CCNC: 47 U/L (ref 10–40)
BASOPHILS # BLD AUTO: 0.07 K/UL (ref 0–0.2)
BASOPHILS NFR BLD: 1 % (ref 0–1.9)
BILIRUB SERPL-MCNC: 0.6 MG/DL (ref 0.1–1)
BUN SERPL-MCNC: 8 MG/DL (ref 6–20)
CALCIUM SERPL-MCNC: 9.7 MG/DL (ref 8.7–10.5)
CHLORIDE SERPL-SCNC: 106 MMOL/L (ref 95–110)
CO2 SERPL-SCNC: 21 MMOL/L (ref 23–29)
CREAT SERPL-MCNC: 0.7 MG/DL (ref 0.5–1.4)
DIFFERENTIAL METHOD BLD: NORMAL
EOSINOPHIL # BLD AUTO: 0.1 K/UL (ref 0–0.5)
EOSINOPHIL NFR BLD: 1.9 % (ref 0–8)
ERYTHROCYTE [DISTWIDTH] IN BLOOD BY AUTOMATED COUNT: 13.8 % (ref 11.5–14.5)
EST. GFR  (NO RACE VARIABLE): >60 ML/MIN/1.73 M^2
GLUCOSE SERPL-MCNC: 118 MG/DL (ref 70–110)
HCT VFR BLD AUTO: 37.5 % (ref 37–48.5)
HGB BLD-MCNC: 12.4 G/DL (ref 12–16)
IMM GRANULOCYTES # BLD AUTO: 0.03 K/UL (ref 0–0.04)
IMM GRANULOCYTES NFR BLD AUTO: 0.4 % (ref 0–0.5)
INR PPP: 0.9 (ref 0.8–1.2)
LYMPHOCYTES # BLD AUTO: 2.6 K/UL (ref 1–4.8)
LYMPHOCYTES NFR BLD: 38.8 % (ref 18–48)
MCH RBC QN AUTO: 28.1 PG (ref 27–31)
MCHC RBC AUTO-ENTMCNC: 33.1 G/DL (ref 32–36)
MCV RBC AUTO: 85 FL (ref 82–98)
MONOCYTES # BLD AUTO: 0.6 K/UL (ref 0.3–1)
MONOCYTES NFR BLD: 9.5 % (ref 4–15)
NEUTROPHILS # BLD AUTO: 3.3 K/UL (ref 1.8–7.7)
NEUTROPHILS NFR BLD: 48.4 % (ref 38–73)
NRBC BLD-RTO: 0 /100 WBC
PLATELET # BLD AUTO: 262 K/UL (ref 150–450)
PMV BLD AUTO: 10.3 FL (ref 9.2–12.9)
POTASSIUM SERPL-SCNC: 4 MMOL/L (ref 3.5–5.1)
PROT SERPL-MCNC: 7.5 G/DL (ref 6–8.4)
PROTHROMBIN TIME: 10.3 SEC (ref 9–12.5)
RBC # BLD AUTO: 4.42 M/UL (ref 4–5.4)
SODIUM SERPL-SCNC: 142 MMOL/L (ref 136–145)
WBC # BLD AUTO: 6.76 K/UL (ref 3.9–12.7)

## 2024-05-13 PROCEDURE — 85025 COMPLETE CBC W/AUTO DIFF WBC: CPT | Performed by: INTERNAL MEDICINE

## 2024-05-13 PROCEDURE — 36415 COLL VENOUS BLD VENIPUNCTURE: CPT | Performed by: INTERNAL MEDICINE

## 2024-05-13 PROCEDURE — 99999 PR PBB SHADOW E&M-EST. PATIENT-LVL III: CPT | Mod: PBBFAC,,, | Performed by: INTERNAL MEDICINE

## 2024-05-13 PROCEDURE — 80053 COMPREHEN METABOLIC PANEL: CPT | Performed by: INTERNAL MEDICINE

## 2024-05-13 PROCEDURE — 99214 OFFICE O/P EST MOD 30 MIN: CPT | Mod: S$GLB,,, | Performed by: INTERNAL MEDICINE

## 2024-05-13 PROCEDURE — 85610 PROTHROMBIN TIME: CPT | Performed by: INTERNAL MEDICINE

## 2024-05-13 PROCEDURE — 82105 ALPHA-FETOPROTEIN SERUM: CPT | Performed by: INTERNAL MEDICINE

## 2024-05-13 NOTE — TELEPHONE ENCOUNTER
Pt advised to come to our office 5/27 for a urine collect for a UPT for MRI. MRI is schd 5/29. AIME HAQ

## 2024-05-13 NOTE — PROGRESS NOTES
[facility] Consultation Note    Reason for Consult:  The encounter diagnosis was Liver disease, unspecified.    PCP: Noemi Marsh       HPI:  This is a 28 y.o. female here for evaluation of: CT scan results - liver mass     Interval history 5/13/24:  Requested IR to give their opinion on the CT findings of the 4.3 x 3 cm mass in the liver, they have recommended MRI with Eovist, which I will order today.      Labs done on 4/30/24 show mild elevation of AST 49, ALT 60, previously normal (20 and 30).  Will repeat enzymes today.         Interval History: 4/30/24  3/12/24  CT enterography abd pelvis with contrast, done for IBS:    Liver: Hepatic steatosis, normal size liver, Heterogenously enhancing mass within the right lobe of the liver measuring 4.3 x 3.6 cm.     GB normal, bile ducts non-dilated. Spleen normal size.     MRI with liver mass protocol is recommended by the radiologist.       AFP: None available.    LFTs on 9/22/23: all normal.     On birth control vaginal ring (generic nuvaring every 3 weeks), since 2012.          Elevated liver enzymes: No labs available  Abnormal imaging: Yes - 4.6 cm liver mass  Cirrhosis: No  Hepatitis C: No  Hepatitis B: No  Fatty liver: Yes  Encephalopathy: No  Post-hospital discharge: No  Symptoms: none related to liver mass.     Primary hepatic manifestations:  Fatigue:No  Edema:No  Ascites:No  Encephalopathy:No  Abdominal pain:No  GI bleeds: No  Pruritus:No  Weight Changes:No  Changes in Bowel habits: No  Muscle cramps:No    Risk factors for liver disease:  No jaundice  No transfusions  No IVDU  Did not snort cocaine or similar agents  Did not live with anyone with hepatitis B or C  Sexual partner not tested  No hepatotoxic medications  No exposure to industrial toxins  Alcohol: drank more during COVID ([id number]) due to depression and a break-up.  Since 2022, once/twice a week, each time 1-2 drinks if by herself, and 3-4 once/month when with friends.   Can stop if needed.        ROS:  Constitutional: No fevers, chills, weight changes, fatigue  ENT: No allergies, nosebleeds,   CV: No chest pain  Pulm: No cough, shortness of breath  Ophtho: No vision changes  GI/Liver: see HPI  Derm: No rash, itching  Heme: No swollen glands, bruising  MSK: No joint pains, joint swelling  : No dysuria, hematuria, decrease in urine output  Endo: No hot or cold intolerance  Neuro: No confusion, disorientation, difficulty with sleep, memory, concentration, syncope, seizure  Psych: No anxiety, depression    Medical History:  has a past medical history of Acid reflux, Counseling for HPV (human papillomavirus) vaccination (2020), Dysmenorrhea, and Uses vaginal contraceptive ring.    Surgical History:  has a past surgical history that includes Hobson tooth extraction; Foot surgery (Left); and Esophagogastroduodenoscopy (N/A, 4/18/2024).    Family History: family history includes Alcohol abuse in her mother; Breast cancer in her paternal aunt; Cancer in her paternal uncle; Diabetes type II in her paternal grandfather; Heart disease in her paternal grandfather; Hyperlipidemia in her mother; Hypertension in her father, paternal grandfather, paternal grandmother, and sister..     Social History:  reports that she has never smoked. She has never used smokeless tobacco. She reports current alcohol use of about 6.0 standard drinks of alcohol per week. She reports that she does not use drugs.    Review of patient's allergies indicates:  No Known Allergies    Current Outpatient Rx   Medication Sig Dispense Refill    dicyclomine (BENTYL) 10 MG capsule Take 1 capsule (10 mg total) by mouth before meals as needed (abdominal pain). 90 capsule 3    etonogestreL-ethinyl estradioL (NUVARING) 0.12-0.015 mg/24 hr vaginal ring INSERT ONE RING VAGINALLY  EVERY 21 DAYS CONTINUOUSLY. 3 each 3    ondansetron (ZOFRAN) 4 MG tablet Take 1 tablet (4 mg total) by mouth every 8 (eight) hours as needed for Nausea. 30  "tablet 1    pantoprazole (PROTONIX) 40 MG tablet Take 1 tablet (40 mg total) by mouth once daily. 30 tablet 3       Objective Findings:    Vital Signs:  /84 (BP Location: Right arm, Patient Position: Sitting, BP Method: Large (Automatic))   Pulse 90   Temp 98.2 °F (36.8 °C) (Oral)   Ht 5' 4" (1.626 m)   Wt 93.9 kg (207 lb 0.2 oz)   SpO2 96% Comment: RA  BMI 35.53 kg/m²   Body mass index is 35.53 kg/m².    Physical Exam:  General Appearance: Well appearing in no acute distress  Head:   Normocephalic, without obvious abnormality  Eyes:    No scleral icterus, EOMI  ENT: Neck supple,   Abdomen: Soft, non tender, non distended with positive bowel sounds in all four quadrants. No hepatosplenomegaly, ascites, or mass  Extremities:  no clubbing, cyanosis or edema  Skin: No rash  Neurologic: Alert, oriented x 3.       Labs:  Lab Results   Component Value Date    WBC 7.79 04/30/2024    HGB 12.9 04/30/2024    HCT 40.8 04/30/2024     04/30/2024    CHOL 203 (H) 09/22/2023    TRIG 220 (H) 09/22/2023    HDL 48 09/22/2023    INR 1.0 04/30/2024    CREATININE 0.7 04/30/2024    BUN 6 04/30/2024    BILITOT 0.4 04/30/2024    ALT 69 (H) 04/30/2024    AST 46 (H) 04/30/2024    ALKPHOS 83 04/30/2024     04/30/2024    K 4.2 04/30/2024     04/30/2024    CO2 25 04/30/2024    TSH 1.785 09/22/2023    HGBA1C 5.7 (H) 09/22/2023    AFP <2.0 04/30/2024         Current Meds:  Current Outpatient Medications   Medication Sig    dicyclomine (BENTYL) 10 MG capsule Take 1 capsule (10 mg total) by mouth before meals as needed (abdominal pain).    etonogestreL-ethinyl estradioL (NUVARING) 0.12-0.015 mg/24 hr vaginal ring INSERT ONE RING VAGINALLY  EVERY 21 DAYS CONTINUOUSLY.    ondansetron (ZOFRAN) 4 MG tablet Take 1 tablet (4 mg total) by mouth every 8 (eight) hours as needed for Nausea.    pantoprazole (PROTONIX) 40 MG tablet Take 1 tablet (40 mg total) by mouth once daily.     No current facility-administered medications " for this visit.        Imaging:       Endoscopy:      Assessment:  1. Liver disease, unspecified        Liver mass, seen on CT, characterization not available, radiologist recommended MRI w wo contrast, using liver mass protocol.     MRI was ordered, but I do not see it being done.  FNH/adenoma.    Patient due for clinic visit today, but wo MRI, no use for her to be seen until it is done.     AST 46, ALT 69 on 4/30/24. At a time when asymptomatic for any infection.      CRP was 21 on 3/8/24    EGD on 4/18/24 - esophagitis.   Started Protonix 40 mg/day for EGD findings of esophagitis with no bleeding.   Used to get a lot of strep throats when she was a kid.  Had Infectious mono at age 12.      Recommendations:  -  Labs today: CMP  -  UPT before MRI  -  Reschedule with new order placed on 5/13/24 for EOVIST MRI abd w wo EOVIST contrast using liver mass protocol if appropriate. Please schedule at [facility].  -  Avoid alcohol, smoking, sedatives and meds with codeine.  -  Avoid high intake of Tylenol (more than 4 extra-strength pills in one day)  -  Call us if any bleeding, fevers, confusion, disorientation occur  -  Transplant option discussed, will evaluate if lesion malignant.  -  Education provided: liver disease, cirrhosis, HCC, monitoring and follow-up.   -  Return in 1 month.       Follow up in about 1 month (around 6/13/2024).      Order summary:  Orders Placed This Encounter   Procedures    MRI Abdomen W WO Contrast       Thank you so much for allowing me to participate in the care of Kailee Grewal MD

## 2024-05-13 NOTE — TELEPHONE ENCOUNTER
I have ordered serologies needed for elevated enzymes.  Please give lab appointment for all tests ordered today.  Mariana Grewal MD

## 2024-05-14 ENCOUNTER — LAB VISIT (OUTPATIENT)
Dept: LAB | Facility: HOSPITAL | Age: 29
End: 2024-05-14
Attending: INTERNAL MEDICINE
Payer: COMMERCIAL

## 2024-05-14 DIAGNOSIS — R74.01 ELEVATED TRANSAMINASE LEVEL: ICD-10-CM

## 2024-05-14 LAB
CERULOPLASMIN SERPL-MCNC: 35 MG/DL (ref 15–45)
HBV CORE AB SERPL QL IA: NORMAL
HBV SURFACE AB SER-ACNC: <3 MIU/ML
HBV SURFACE AB SER-ACNC: NORMAL M[IU]/ML
HBV SURFACE AG SERPL QL IA: NORMAL
HCV AB SERPL QL IA: NORMAL
IGA SERPL-MCNC: 280 MG/DL (ref 40–350)
IGG SERPL-MCNC: 971 MG/DL (ref 650–1600)
IGM SERPL-MCNC: 207 MG/DL (ref 50–300)

## 2024-05-14 PROCEDURE — 87340 HEPATITIS B SURFACE AG IA: CPT | Performed by: INTERNAL MEDICINE

## 2024-05-14 PROCEDURE — 86706 HEP B SURFACE ANTIBODY: CPT | Mod: 91 | Performed by: INTERNAL MEDICINE

## 2024-05-14 PROCEDURE — 82784 ASSAY IGA/IGD/IGG/IGM EACH: CPT | Mod: 59 | Performed by: INTERNAL MEDICINE

## 2024-05-14 PROCEDURE — 36415 COLL VENOUS BLD VENIPUNCTURE: CPT | Performed by: INTERNAL MEDICINE

## 2024-05-14 PROCEDURE — 82390 ASSAY OF CERULOPLASMIN: CPT | Performed by: INTERNAL MEDICINE

## 2024-05-14 PROCEDURE — 86039 ANTINUCLEAR ANTIBODIES (ANA): CPT | Performed by: INTERNAL MEDICINE

## 2024-05-14 PROCEDURE — 86235 NUCLEAR ANTIGEN ANTIBODY: CPT | Mod: 59 | Performed by: INTERNAL MEDICINE

## 2024-05-14 PROCEDURE — 86015 ACTIN ANTIBODY EACH: CPT | Performed by: INTERNAL MEDICINE

## 2024-05-14 PROCEDURE — 86038 ANTINUCLEAR ANTIBODIES: CPT | Performed by: INTERNAL MEDICINE

## 2024-05-14 PROCEDURE — 86704 HEP B CORE ANTIBODY TOTAL: CPT | Performed by: INTERNAL MEDICINE

## 2024-05-14 PROCEDURE — 86381 MITOCHONDRIAL ANTIBODY EACH: CPT | Performed by: INTERNAL MEDICINE

## 2024-05-14 PROCEDURE — 86803 HEPATITIS C AB TEST: CPT | Performed by: INTERNAL MEDICINE

## 2024-05-15 LAB
ANA PATTERN 1: NORMAL
ANA SER QL IF: POSITIVE
ANA TITR SER IF: NORMAL {TITER}
MITOCHONDRIA AB TITR SER IF: NORMAL {TITER}
SMOOTH MUSCLE AB TITR SER IF: NORMAL {TITER}

## 2024-05-16 ENCOUNTER — TELEPHONE (OUTPATIENT)
Dept: HEPATOLOGY | Facility: CLINIC | Age: 29
End: 2024-05-16
Payer: COMMERCIAL

## 2024-05-16 DIAGNOSIS — Z29.9 PROPHYLACTIC MEASURE: Primary | ICD-10-CM

## 2024-05-16 NOTE — TELEPHONE ENCOUNTER
----- Message from Manolo Angeles MA sent at 5/16/2024 11:22 AM CDT -----    ----- Message -----  From: Netta Grewal MD  Sent: 5/16/2024   8:52 AM CDT  To: [facility]wick Staff    Please inform patient:   MEENAKSHI is positive 1:160, IgG normal, other autoimmune markers negative.  Will need follow-up LFTs every month x 3, if remain persistently elevated, will need liver biopsy.     Hep B and C antibodies negative, indicates no prior exposure to these viruses. Needs hep B vaccine, Heplisav 2 injections a month apart. Placing order. Pl give injection appts.

## 2024-05-16 NOTE — TELEPHONE ENCOUNTER
I informed patient:   MEENAKSHI is positive 1:160, IgG normal, other autoimmune markers negative.  Will need follow-up LFTs every month x 3, if remain persistently elevated, will need liver biopsy.     Hep B and C antibodies negative, indicates no prior exposure to these viruses. Needs hep B vaccine, Heplisav 2 injections a month apart. Placing order. Pl give injection appts.     Patient verbalized understanding.

## 2024-05-17 LAB
ANTI SM ANTIBODY: 0.07 RATIO (ref 0–0.99)
ANTI SM/RNP ANTIBODY: 0.07 RATIO (ref 0–0.99)
ANTI-SM INTERPRETATION: NEGATIVE
ANTI-SM/RNP INTERPRETATION: NEGATIVE
ANTI-SSA ANTIBODY: 0.11 RATIO (ref 0–0.99)
ANTI-SSA INTERPRETATION: NEGATIVE
ANTI-SSB ANTIBODY: 0.08 RATIO (ref 0–0.99)
ANTI-SSB INTERPRETATION: NEGATIVE
DSDNA AB SER-ACNC: NORMAL [IU]/ML

## 2024-05-17 NOTE — TELEPHONE ENCOUNTER
----- Message from Bereket Bautista MD sent at 5/13/2024 12:31 PM CDT -----  Regarding: RE: liver lesion - do I need an mRI?  Sorry I missed your call.  I agree with Tim.  ----- Message -----  From: Tim Taylor MD  Sent: 5/13/2024  12:08 PM CDT  To: Netta Grewal MD; Ashley Perez, RN; #  Subject: RE: liver lesion - do I need an mRI?             Discussed with Netta on the phone. Needs MRI w Eovist.    Tim  ----- Message -----  From: Netta Grewal MD  Sent: 5/13/2024  11:45 AM CDT  To: Tim Taylor MD; Ashley Perez, RN; #  Subject: liver lesion - do I need an mRI?                 Please review her CT and tell me if I need an MRI. She is in clinic now, and I forgot to ask your opinion when I saw her in clinic 3 weeks ago.    Thanks  Netta

## 2024-05-28 ENCOUNTER — LAB VISIT (OUTPATIENT)
Dept: LAB | Facility: HOSPITAL | Age: 29
End: 2024-05-28
Attending: INTERNAL MEDICINE
Payer: COMMERCIAL

## 2024-05-28 DIAGNOSIS — R16.0 LIVER MASS: ICD-10-CM

## 2024-05-28 DIAGNOSIS — R16.0 LIVER MASS: Primary | ICD-10-CM

## 2024-05-28 LAB — B-HCG UR QL: NEGATIVE

## 2024-05-28 PROCEDURE — 81025 URINE PREGNANCY TEST: CPT | Performed by: INTERNAL MEDICINE

## 2024-05-29 ENCOUNTER — HOSPITAL ENCOUNTER (OUTPATIENT)
Dept: RADIOLOGY | Facility: HOSPITAL | Age: 29
Discharge: HOME | End: 2024-05-29
Attending: INTERNAL MEDICINE
Payer: COMMERCIAL

## 2024-05-29 DIAGNOSIS — K76.9 LIVER DISEASE, UNSPECIFIED: ICD-10-CM

## 2024-05-29 PROCEDURE — 74183 MRI ABD W/O CNTR FLWD CNTR: CPT | Mod: 26,,, | Performed by: INTERNAL MEDICINE

## 2024-05-29 PROCEDURE — 25500020 PHARM REV CODE 255: Performed by: INTERNAL MEDICINE

## 2024-05-29 PROCEDURE — 74183 MRI ABD W/O CNTR FLWD CNTR: CPT | Mod: TC

## 2024-05-29 PROCEDURE — A9581 GADOXETATE DISODIUM INJ: HCPCS | Performed by: INTERNAL MEDICINE

## 2024-05-29 RX ADMIN — GADOXETATE DISODIUM 10 ML: 181.43 INJECTION, SOLUTION INTRAVENOUS at 07:05

## 2024-06-03 ENCOUNTER — TELEPHONE (OUTPATIENT)
Dept: HEPATOLOGY | Facility: CLINIC | Age: 29
End: 2024-06-03
Payer: COMMERCIAL

## 2024-06-03 NOTE — TELEPHONE ENCOUNTER
The patient have been notified of pregnancy test results.     ----- Message from Netta Grewal MD sent at 5/31/2024  3:46 PM CDT -----  Pregnacy test negative. Inform pt

## 2024-06-10 ENCOUNTER — TELEPHONE (OUTPATIENT)
Dept: HEPATOLOGY | Facility: CLINIC | Age: 29
End: 2024-06-10
Payer: COMMERCIAL

## 2024-06-12 ENCOUNTER — TELEPHONE (OUTPATIENT)
Dept: HEPATOLOGY | Facility: CLINIC | Age: 29
End: 2024-06-12
Payer: COMMERCIAL

## 2024-06-12 DIAGNOSIS — K76.9 LIVER DISEASE, UNSPECIFIED: Primary | ICD-10-CM

## 2024-06-12 NOTE — TELEPHONE ENCOUNTER
IR Review of MRI below:  Lesion is FNH, will get follow-up MRI with Eovist in 1 year.  Please inform patient. Put reminder for MRI with Eovist. Order placed.   Thanks    SASCHA Grewal      RE: liver lesion on MRI with Eovist  Received: 2 days ago  Tim Taylor MD Tramel, Richard J., MD; Netta Grewal MD; Bereket Bautista MD; Ashley Perez, ANYI; Cecilia Cook  Agree with Renetta Dumont          Previous Messages       ----- Message -----  From: Lucius Massey MD  Sent: 6/8/2024   7:33 PM CDT  To: Netta Grewal MD; Tim Taylor MD; *  Subject: RE: liver lesion on MRI with Eovist              I agree with FNH. Has typical delayed phase rim on increased intensity and decreased intensity of central scar. I don't think it warrants follow-up but could consider repeat MRI in a year  Nitish  ----- Message -----  From: Netta Grewal MD  Sent: 6/8/2024   1:50 PM CDT  To: Tim Taylor MD; Ashley Perez, RN; *  Subject: liver lesion on MRI with Eovist                  Please review 5/29/24 MRI with Eovist on this 28 yr-old female.  Agree with FNH? Prob does not need follow-up imaging?    Thanks  Netta

## 2024-06-12 NOTE — TELEPHONE ENCOUNTER
I called pat. No answer.  Left message that she will need MRI w Eovist in a year. Phone # given to call back.  Recall appt placed

## 2024-06-17 ENCOUNTER — LAB VISIT (OUTPATIENT)
Dept: LAB | Facility: HOSPITAL | Age: 29
End: 2024-06-17
Payer: COMMERCIAL

## 2024-06-17 DIAGNOSIS — R16.0 LIVER MASS: ICD-10-CM

## 2024-06-17 LAB
ALBUMIN SERPL BCP-MCNC: 3.9 G/DL (ref 3.5–5.2)
ALP SERPL-CCNC: 85 U/L (ref 55–135)
ALT SERPL W/O P-5'-P-CCNC: 86 U/L (ref 10–44)
ANION GAP SERPL CALC-SCNC: 10 MMOL/L (ref 8–16)
AST SERPL-CCNC: 58 U/L (ref 10–40)
BILIRUB SERPL-MCNC: 0.8 MG/DL (ref 0.1–1)
BUN SERPL-MCNC: 8 MG/DL (ref 6–20)
CALCIUM SERPL-MCNC: 9.6 MG/DL (ref 8.7–10.5)
CHLORIDE SERPL-SCNC: 105 MMOL/L (ref 95–110)
CO2 SERPL-SCNC: 24 MMOL/L (ref 23–29)
CREAT SERPL-MCNC: 0.7 MG/DL (ref 0.5–1.4)
EST. GFR  (NO RACE VARIABLE): >60 ML/MIN/1.73 M^2
GLUCOSE SERPL-MCNC: 101 MG/DL (ref 70–110)
POTASSIUM SERPL-SCNC: 4.4 MMOL/L (ref 3.5–5.1)
PROT SERPL-MCNC: 7.4 G/DL (ref 6–8.4)
SODIUM SERPL-SCNC: 139 MMOL/L (ref 136–145)

## 2024-06-17 PROCEDURE — 80053 COMPREHEN METABOLIC PANEL: CPT | Performed by: INTERNAL MEDICINE

## 2024-06-17 PROCEDURE — 36415 COLL VENOUS BLD VENIPUNCTURE: CPT | Performed by: INTERNAL MEDICINE

## 2024-06-21 ENCOUNTER — TELEPHONE (OUTPATIENT)
Dept: HEPATOLOGY | Facility: CLINIC | Age: 29
End: 2024-06-21
Payer: COMMERCIAL

## 2024-06-21 ENCOUNTER — TELEPHONE (OUTPATIENT)
Dept: INTERVENTIONAL RADIOLOGY/VASCULAR | Facility: CLINIC | Age: 29
End: 2024-06-21
Payer: COMMERCIAL

## 2024-06-21 DIAGNOSIS — R76.8 ANA POSITIVE: Primary | ICD-10-CM

## 2024-06-21 DIAGNOSIS — R74.01 ELEVATED TRANSAMINASE LEVEL: ICD-10-CM

## 2024-06-21 NOTE — TELEPHONE ENCOUNTER
IR Liver biopsy and consult orders placed.  Please schedule both. I will also talk to her about these.

## 2024-06-21 NOTE — TELEPHONE ENCOUNTER
------ Message from Dr SASCHA Grewal --------  MEENAKSHI was positive, liver enzymes remain elevated.   Needs a liver biopsy to see if she has autoimmune hepatitis.       Discussed with patient about doing a liver biopsy, she understands the reason for the biopsy.  Please go forward scheduling her for biopsy and consult with IR. larissa     Both orders are in and Patient will be contacted by IR regarding the Appointment for the Liver Biopsy.    Will send the Patient a My Chart message.

## 2024-06-21 NOTE — TELEPHONE ENCOUNTER
----- Message from Netta Grewal MD sent at 6/21/2024  8:45 AM CDT -----  Discussed with patient about doing a liver biopsy, she understands the reason for the biopsy.  Please go forward scheduling her for biopsy and consult with KAYLA dias

## 2024-06-21 NOTE — TELEPHONE ENCOUNTER
Left message for pt to return my call. Need to schedule IR procedure.  Please forward call to [phone]. Thanks

## 2024-06-24 ENCOUNTER — TELEPHONE (OUTPATIENT)
Dept: INTERVENTIONAL RADIOLOGY/VASCULAR | Facility: CLINIC | Age: 29
End: 2024-06-24
Payer: COMMERCIAL

## 2024-06-24 DIAGNOSIS — R74.8 ELEVATED LIVER ENZYMES: Primary | ICD-10-CM

## 2024-06-24 NOTE — TELEPHONE ENCOUNTER
Spoke to pt on phone,Pt is scheduled on 7/3/2024 with arrival time of 9am for IR procedure at [facility] location.  Preop instructions given (NPO after midnight, MUST have a ride home, Nurse will call 1-2  days before to go over instructions and medications), pt verbally understood. Pt aware and confirmed, Thanks

## 2024-06-26 ENCOUNTER — LAB VISIT (OUTPATIENT)
Dept: LAB | Facility: HOSPITAL | Age: 29
End: 2024-06-26
Attending: FAMILY MEDICINE
Payer: COMMERCIAL

## 2024-06-26 DIAGNOSIS — R74.8 ELEVATED LIVER ENZYMES: ICD-10-CM

## 2024-06-26 LAB
BASOPHILS # BLD AUTO: 0.07 K/UL (ref 0–0.2)
BASOPHILS NFR BLD: 0.9 % (ref 0–1.9)
DIFFERENTIAL METHOD BLD: NORMAL
EOSINOPHIL # BLD AUTO: 0.2 K/UL (ref 0–0.5)
EOSINOPHIL NFR BLD: 1.8 % (ref 0–8)
ERYTHROCYTE [DISTWIDTH] IN BLOOD BY AUTOMATED COUNT: 13.9 % (ref 11.5–14.5)
HCT VFR BLD AUTO: 39.3 % (ref 37–48.5)
HGB BLD-MCNC: 12.8 G/DL (ref 12–16)
IMM GRANULOCYTES # BLD AUTO: 0.02 K/UL (ref 0–0.04)
IMM GRANULOCYTES NFR BLD AUTO: 0.2 % (ref 0–0.5)
INR PPP: 0.9 (ref 0.8–1.2)
LYMPHOCYTES # BLD AUTO: 3 K/UL (ref 1–4.8)
LYMPHOCYTES NFR BLD: 36.8 % (ref 18–48)
MCH RBC QN AUTO: 28.1 PG (ref 27–31)
MCHC RBC AUTO-ENTMCNC: 32.6 G/DL (ref 32–36)
MCV RBC AUTO: 86 FL (ref 82–98)
MONOCYTES # BLD AUTO: 0.7 K/UL (ref 0.3–1)
MONOCYTES NFR BLD: 8.2 % (ref 4–15)
NEUTROPHILS # BLD AUTO: 4.2 K/UL (ref 1.8–7.7)
NEUTROPHILS NFR BLD: 52.1 % (ref 38–73)
NRBC BLD-RTO: 0 /100 WBC
PLATELET # BLD AUTO: 252 K/UL (ref 150–450)
PMV BLD AUTO: 11 FL (ref 9.2–12.9)
PROTHROMBIN TIME: 10.4 SEC (ref 9–12.5)
RBC # BLD AUTO: 4.56 M/UL (ref 4–5.4)
WBC # BLD AUTO: 8.15 K/UL (ref 3.9–12.7)

## 2024-06-26 PROCEDURE — 36415 COLL VENOUS BLD VENIPUNCTURE: CPT | Performed by: FAMILY MEDICINE

## 2024-06-26 PROCEDURE — 85610 PROTHROMBIN TIME: CPT | Performed by: FAMILY MEDICINE

## 2024-06-26 PROCEDURE — 85025 COMPLETE CBC W/AUTO DIFF WBC: CPT | Performed by: FAMILY MEDICINE

## 2024-06-27 ENCOUNTER — PATIENT MESSAGE (OUTPATIENT)
Dept: INTERVENTIONAL RADIOLOGY/VASCULAR | Facility: HOSPITAL | Age: 29
End: 2024-06-27
Payer: COMMERCIAL

## 2024-06-28 ENCOUNTER — LAB VISIT (OUTPATIENT)
Dept: LAB | Facility: HOSPITAL | Age: 29
End: 2024-06-28
Attending: STUDENT IN AN ORGANIZED HEALTH CARE EDUCATION/TRAINING PROGRAM
Payer: COMMERCIAL

## 2024-06-28 ENCOUNTER — HOSPITAL ENCOUNTER (OUTPATIENT)
Dept: RADIOLOGY | Facility: HOSPITAL | Age: 29
Discharge: HOME | End: 2024-06-28
Attending: STUDENT IN AN ORGANIZED HEALTH CARE EDUCATION/TRAINING PROGRAM
Payer: COMMERCIAL

## 2024-06-28 ENCOUNTER — OFFICE VISIT (OUTPATIENT)
Dept: PRIMARY CARE CLINIC | Facility: CLINIC | Age: 29
End: 2024-06-28
Payer: COMMERCIAL

## 2024-06-28 VITALS
BODY MASS INDEX: 34.85 KG/M2 | SYSTOLIC BLOOD PRESSURE: 110 MMHG | HEIGHT: 64 IN | HEART RATE: 84 BPM | OXYGEN SATURATION: 98 % | DIASTOLIC BLOOD PRESSURE: 80 MMHG | WEIGHT: 204.13 LBS

## 2024-06-28 DIAGNOSIS — R73.03 PREDIABETES: ICD-10-CM

## 2024-06-28 DIAGNOSIS — M79.89 LEFT AXILLARY SWELLING: ICD-10-CM

## 2024-06-28 DIAGNOSIS — M79.89 LEFT AXILLARY SWELLING: Primary | ICD-10-CM

## 2024-06-28 DIAGNOSIS — R59.1 LYMPHADENOPATHY: Primary | ICD-10-CM

## 2024-06-28 DIAGNOSIS — R59.1 LYMPHADENOPATHY: ICD-10-CM

## 2024-06-28 LAB
ESTIMATED AVG GLUCOSE: 114 MG/DL (ref 68–131)
HBA1C MFR BLD: 5.6 % (ref 4–5.6)

## 2024-06-28 PROCEDURE — 83036 HEMOGLOBIN GLYCOSYLATED A1C: CPT | Performed by: STUDENT IN AN ORGANIZED HEALTH CARE EDUCATION/TRAINING PROGRAM

## 2024-06-28 PROCEDURE — 99999 PR PBB SHADOW E&M-EST. PATIENT-LVL IV: CPT | Mod: PBBFAC,,, | Performed by: STUDENT IN AN ORGANIZED HEALTH CARE EDUCATION/TRAINING PROGRAM

## 2024-06-28 PROCEDURE — 76882 US LMTD JT/FCL EVL NVASC XTR: CPT | Mod: 26,LT,, | Performed by: INTERNAL MEDICINE

## 2024-06-28 PROCEDURE — 76882 US LMTD JT/FCL EVL NVASC XTR: CPT | Mod: TC,LT

## 2024-06-28 PROCEDURE — 36415 COLL VENOUS BLD VENIPUNCTURE: CPT | Performed by: STUDENT IN AN ORGANIZED HEALTH CARE EDUCATION/TRAINING PROGRAM

## 2024-06-28 RX ORDER — AMOXICILLIN AND CLAVULANATE POTASSIUM 875; 125 MG/1; MG/1
1 TABLET, FILM COATED ORAL EVERY 12 HOURS
Qty: 14 TABLET | Refills: 0 | Status: SHIPPED | OUTPATIENT
Start: 2024-06-28 | End: 2024-06-28 | Stop reason: SDUPTHER

## 2024-06-28 RX ORDER — AMOXICILLIN AND CLAVULANATE POTASSIUM 875; 125 MG/1; MG/1
1 TABLET, FILM COATED ORAL EVERY 12 HOURS
Qty: 14 TABLET | Refills: 0 | Status: SHIPPED | OUTPATIENT
Start: 2024-06-28 | End: 2024-07-05

## 2024-06-28 NOTE — PROGRESS NOTES
Subjective:       Patient ID: Kailee Messer is a 28 y.o. female.   Chief Complaint: Edema (Left armpit)    Patient presents today to discuss left-sided armpit swelling.  Swelling started May 21st.  She noticed soreness in the axilla that day.  She noted swelling following, and has continued up until this point.  No fevers.  No recent illness.  No drainage.  She did her axilla waxed on May 20th.    Pre diabetes: Last hemoglobin A1c was 5.7%.  Due for repeat hemoglobin A1c.    Review of Systems   Constitutional:  Negative for fatigue and fever.   Respiratory:  Negative for cough and shortness of breath.    Cardiovascular:  Negative for chest pain.   Gastrointestinal:  Negative for abdominal pain, diarrhea, nausea and vomiting.   Musculoskeletal:  Negative for back pain.   Neurological:  Negative for weakness.              Objective:        Physical Exam  HENT:      Head: Normocephalic and atraumatic.      Nose: Nose normal.      Mouth/Throat:      Mouth: Mucous membranes are moist.      Pharynx: Oropharynx is clear.   Eyes:      Extraocular Movements: Extraocular movements intact.      Conjunctiva/sclera: Conjunctivae normal.      Pupils: Pupils are equal, round, and reactive to light.   Pulmonary:      Effort: Pulmonary effort is normal.   Musculoskeletal:         General: Tenderness present. No swelling. Normal range of motion.      Cervical back: Normal range of motion.      Right lower leg: No edema.      Left lower leg: No edema.   Skin:     General: Skin is warm.      Findings: No lesion or rash.   Neurological:      General: No focal deficit present.      Mental Status: She is alert and oriented to person, place, and time.      Motor: No weakness.   Psychiatric:         Mood and Affect: Mood normal.         Thought Content: Thought content normal.         Assessment:         Problem List Items Addressed This Visit    None  Visit Diagnoses       Left axillary swelling    -  Primary    Relevant Orders    US Soft  Tissue Axilla, Left    Prediabetes        Relevant Orders    HEMOGLOBIN A1C              Plan:         1. Left axillary swelling  -     Cancel: US Soft Tissue Axilla, Left; Future; Expected date: 06/28/2024  -     US Soft Tissue Axilla, Left; Future; Expected date: 06/28/2024  Ultrasound of axilla ordered, follow-up    2. Prediabetes  -     HEMOGLOBIN A1C; Future; Expected date: 06/28/2024  Follow-up hemoglobin A1c          Noemi Marsh MD

## 2024-07-01 ENCOUNTER — TELEPHONE (OUTPATIENT)
Dept: INTERVENTIONAL RADIOLOGY/VASCULAR | Facility: HOSPITAL | Age: 29
End: 2024-07-01
Payer: COMMERCIAL

## 2024-07-01 NOTE — NURSING
Pre-procedure call complete.  2 patient identifier used (name and ).   Arrival time 9am.  Patient instructed not to eat or drink anything after midnight the night before procedure.  Patient aware will need someone to provide transport home and monitor pt 8 hours post procedure.  No driving for at least 24 hours after procedure.  Patient reports she does not take blood pressure, heart medications, seizure and anti-rejection medications.    Do not take sleep medication (including OTC) the night before procedure.  Arrival time and location given.  Expected length of stay reviewed.  Covid screening completed.  Patient verbalized understanding of all pre-procedure instructions.  Written instructions and directions sent to patient in [email or web address].

## 2024-07-03 ENCOUNTER — TELEPHONE (OUTPATIENT)
Dept: HEPATOLOGY | Facility: CLINIC | Age: 29
End: 2024-07-03
Payer: COMMERCIAL

## 2024-07-03 ENCOUNTER — HOSPITAL ENCOUNTER (OUTPATIENT)
Dept: INTERVENTIONAL RADIOLOGY/VASCULAR | Facility: HOSPITAL | Age: 29
Discharge: HOME | End: 2024-07-03
Attending: INTERNAL MEDICINE | Admitting: STUDENT IN AN ORGANIZED HEALTH CARE EDUCATION/TRAINING PROGRAM
Payer: COMMERCIAL

## 2024-07-03 VITALS
RESPIRATION RATE: 19 BRPM | DIASTOLIC BLOOD PRESSURE: 76 MMHG | HEIGHT: 64 IN | OXYGEN SATURATION: 98 % | BODY MASS INDEX: 34.49 KG/M2 | SYSTOLIC BLOOD PRESSURE: 125 MMHG | WEIGHT: 202 LBS | HEART RATE: 74 BPM | TEMPERATURE: 98 F

## 2024-07-03 DIAGNOSIS — R76.8 ANA POSITIVE: ICD-10-CM

## 2024-07-03 DIAGNOSIS — R74.01 ELEVATED TRANSAMINASE LEVEL: ICD-10-CM

## 2024-07-03 LAB
B-HCG UR QL: NEGATIVE
CTP QC/QA: YES

## 2024-07-03 PROCEDURE — 88307 TISSUE EXAM BY PATHOLOGIST: CPT | Performed by: PATHOLOGY

## 2024-07-03 PROCEDURE — 88313 SPECIAL STAINS GROUP 2: CPT | Performed by: PATHOLOGY

## 2024-07-03 PROCEDURE — 99900035 HC TECH TIME PER 15 MIN (STAT)

## 2024-07-03 PROCEDURE — 94761 N-INVAS EAR/PLS OXIMETRY MLT: CPT

## 2024-07-03 PROCEDURE — 81025 URINE PREGNANCY TEST: CPT | Performed by: FAMILY MEDICINE

## 2024-07-03 PROCEDURE — 63600175 PHARM REV CODE 636 W HCPCS: Performed by: STUDENT IN AN ORGANIZED HEALTH CARE EDUCATION/TRAINING PROGRAM

## 2024-07-03 RX ORDER — ONDANSETRON HYDROCHLORIDE 2 MG/ML
4 INJECTION, SOLUTION INTRAVENOUS EVERY 6 HOURS PRN
Status: DISCONTINUED | OUTPATIENT
Start: 2024-07-03 | End: 2024-07-04 | Stop reason: HOSPADM

## 2024-07-03 RX ORDER — FENTANYL CITRATE 50 UG/ML
INJECTION, SOLUTION INTRAMUSCULAR; INTRAVENOUS
Status: COMPLETED | OUTPATIENT
Start: 2024-07-03 | End: 2024-07-03

## 2024-07-03 RX ORDER — SODIUM CHLORIDE 9 MG/ML
INJECTION, SOLUTION INTRAVENOUS CONTINUOUS
Status: DISCONTINUED | OUTPATIENT
Start: 2024-07-03 | End: 2024-07-04 | Stop reason: HOSPADM

## 2024-07-03 RX ORDER — LIDOCAINE HYDROCHLORIDE 10 MG/ML
1 INJECTION, SOLUTION EPIDURAL; INFILTRATION; INTRACAUDAL; PERINEURAL ONCE
Status: DISCONTINUED | OUTPATIENT
Start: 2024-07-03 | End: 2024-07-04 | Stop reason: HOSPADM

## 2024-07-03 RX ORDER — MIDAZOLAM HYDROCHLORIDE 1 MG/ML
INJECTION, SOLUTION INTRAMUSCULAR; INTRAVENOUS
Status: COMPLETED | OUTPATIENT
Start: 2024-07-03 | End: 2024-07-03

## 2024-07-03 RX ADMIN — MIDAZOLAM HYDROCHLORIDE 2 MG: 1 INJECTION, SOLUTION INTRAMUSCULAR; INTRAVENOUS at 10:07

## 2024-07-03 RX ADMIN — FENTANYL CITRATE 75 MCG: 50 INJECTION, SOLUTION INTRAMUSCULAR; INTRAVENOUS at 10:07

## 2024-07-03 NOTE — TELEPHONE ENCOUNTER
IR Liver Pathology Conference Note    Patient:  Kailee Messer  MRN:   [id number]  YOB: 1995  Date of Transplant:  N/A  Native Diagnosis:     Discussion/Plan:    Presenter: Hepatologist - Netta Grewal MD    Reason for presenting: elevated liver function  Referred for liver mass, turned out to be FNH. But has slowly rising elevations of transaminases AST to 58, ALT to 86, which ed. MEENAKSHI is positive, 1;160. So liver biopsy was obtained for possible autoimmune hepatitis.    Concerns for Pathologists:     Lab Results  WBC (K/uL)   Date Value   06/26/2024 8.15   05/13/2024 6.76   04/30/2024 7.79     PLT (K/uL)   Date Value   06/26/2024 252   05/13/2024 262   04/30/2024 332     INR (no units)   Date Value   06/26/2024 0.9   05/13/2024 0.9   04/30/2024 1.0     AST (U/L)   Date Value   06/17/2024 58 (H)     ALT (U/L)   Date Value   06/17/2024 86 (H)   05/13/2024 69 (H)   04/30/2024 69 (H)     BILITOT (mg/dL)   Date Value   06/17/2024 0.8   05/13/2024 0.6   04/30/2024 0.4     ALKPHOS (U/L)   Date Value   06/17/2024 85   05/13/2024 85   04/30/2024 83     CREATININE (mg/dL)   Date Value   06/17/2024 0.7   05/13/2024 0.7   04/30/2024 0.7     AFP (ng/mL)   Date Value   05/13/2024 <2.0   04/30/2024 <2.0     CMVIGGINTERP (no units)   Date Value   04/10/2008 Positive - Detectable Ab (A)

## 2024-07-03 NOTE — H&P
Radiology History & Physical      SUBJECTIVE:     Chief Complaint: liver dysfunction    History of Present Illness:  Kailee Messer is a 28 y.o. female who presents for random liver biopsy  Past Medical History:   Diagnosis Date    Acid reflux     Counseling for HPV (human papillomavirus) vaccination 2020    Received all 3, per pt     Dysmenorrhea     Uses vaginal contraceptive ring      Past Surgical History:   Procedure Laterality Date    ESOPHAGOGASTRODUODENOSCOPY N/A 4/18/2024    Procedure: EGD (ESOPHAGOGASTRODUODENOSCOPY);  Surgeon: Anand Owens MD;  Location: [facility];  Service: Endoscopy;  Laterality: N/A;  prep instructions given to pt in clinic/ ray pt /  4/11/24- pc complete. DBM    FOOT SURGERY Left     WISDOM TOOTH EXTRACTION         Home Meds:   Prior to Admission medications    Medication Sig Start Date End Date Taking? Authorizing Provider   amoxicillin-clavulanate 875-125mg (AUGMENTIN) 875-125 mg per tablet Take 1 tablet by mouth every 12 (twelve) hours. for 7 days 6/28/24 7/5/24 Yes Noemi Marsh MD   ondansetron (ZOFRAN) 4 MG tablet Take 1 tablet (4 mg total) by mouth every 8 (eight) hours as needed for Nausea. 3/8/24  Yes Anand Owens MD   pantoprazole (PROTONIX) 40 MG tablet Take 1 tablet (40 mg total) by mouth once daily. 4/23/24  Yes Anand Owens MD   dicyclomine (BENTYL) 10 MG capsule Take 1 capsule (10 mg total) by mouth before meals as needed (abdominal pain). 9/20/23   Anand Owens MD   etonogestreL-ethinyl estradioL (NUVARING) 0.12-0.015 mg/24 hr vaginal ring INSERT ONE RING VAGINALLY  EVERY 21 DAYS CONTINUOUSLY. 6/29/23   Kamla Mclaughlin MD   hepatitis B vacc-CpG 1018, PF, (HEPLISAV-B, PF,) 20 mcg/0.5 mL Syrg to be administer by pharmd 5/28/24   Amparo Fleming, Christopher     Anticoagulants/Antiplatelets: no anticoagulation    Allergies: Review of patient's allergies indicates:  No Known Allergies  Sedation History:  no adverse reactions    Review of Systems:   Hematological: no known  "coagulopathies  Respiratory: no shortness of breath  Cardiovascular: no chest pain  Gastrointestinal: no abdominal pain  Genito-Urinary: no dysuria  Musculoskeletal: negative  Neurological: no TIA or stroke symptoms         OBJECTIVE:     Vital Signs (Most Recent)       Physical Exam:  ASA: 2  Mallampati: 2    General: no acute distress  Mental Status: alert and oriented to person, place and time  HEENT: normocephalic, atraumatic  Chest: unlabored breathing  Heart: regular heart rate  Abdomen: nondistended  Extremity: moves all extremities    Laboratory  Lab Results   Component Value Date    INR 0.9 06/26/2024       Lab Results   Component Value Date    WBC 8.15 06/26/2024    HGB 12.8 06/26/2024    HCT 39.3 06/26/2024    MCV 86 06/26/2024     06/26/2024      Lab Results   Component Value Date     06/17/2024     06/17/2024    K 4.4 06/17/2024     06/17/2024    CO2 24 06/17/2024    BUN 8 06/17/2024    CREATININE 0.7 06/17/2024    CALCIUM 9.6 06/17/2024    ALT 86 (H) 06/17/2024    AST 58 (H) 06/17/2024    ALBUMIN 3.9 06/17/2024    BILITOT 0.8 06/17/2024       ASSESSMENT/PLAN:     Sedation Plan: moderate  Patient will undergo random liver biopsy.    Lucius Massey MD ([place])  Interventional Radiology          "

## 2024-07-03 NOTE — PROCEDURES
"  Pre Op Diagnosis: Liver dysfunction  Post Op Diagnosis: Same    Procedure: as scheduled    Procedure performed by: Bryson    Written Informed Consent Obtained: Yes  Specimen Removed: YES 3 18 g cores  Estimated Blood Loss: Minimal    Findings:   Successful US guided liver biopsy. Gel foam pledgets used for tract embolization    Patient tolerated procedure well.    Lucius Massey MD ([facility])  Interventional Radiology  [phone]      "

## 2024-07-03 NOTE — DISCHARGE SUMMARY
"Radiology Discharge Summary      Hospital Course: No complications    Admit Date: 7/3/2024  Discharge Date: 7/3/2024     Instructions Given to Patient: Yes  Diet: Resume prior diet  Activity: activity as tolerated and no driving for today    Description of Condition on Discharge: Stable  Vital Signs (Most Recent): Temp: 97.3 °F (36.3 °C) (07/03/24 0928)  Pulse: 81 (07/03/24 1026)  Resp: 18 (07/03/24 1026)  BP: 126/85 (07/03/24 1026)  SpO2: 96 % (07/03/24 1026)    Discharge Disposition: Home    Discharge Diagnosis: liver dysfunction     Follow-up: as scheduled    Lucius Massey MD ([place])  Interventional Radiology  [phone]      "

## 2024-07-03 NOTE — Clinical Note
Right: Abdomen.   Scrubbed with Chlorohexidine.   Painted with Chlorohexidine.  Skin prep dry before draping.

## 2024-07-03 NOTE — PROGRESS NOTES
Pt arrived from IR via stretcher with IR . No distress noted at this time. Pt connected to monitor device 14. Rt upper quad site clean and dry soft. Pt denies pain. Will maintain bedrest for 2 hours and continue frequent assessments.

## 2024-07-05 ENCOUNTER — PATIENT MESSAGE (OUTPATIENT)
Dept: HEPATOLOGY | Facility: CLINIC | Age: 29
End: 2024-07-05
Payer: COMMERCIAL

## 2024-07-05 ENCOUNTER — TELEPHONE (OUTPATIENT)
Dept: HEPATOLOGY | Facility: CLINIC | Age: 29
End: 2024-07-05
Payer: COMMERCIAL

## 2024-07-05 DIAGNOSIS — E88.89 STEATOSIS: ICD-10-CM

## 2024-07-05 DIAGNOSIS — E78.00 ELEVATED CHOLESTEROL: Primary | ICD-10-CM

## 2024-07-05 LAB
FINAL PATHOLOGIC DIAGNOSIS: NORMAL
GROSS: NORMAL
Lab: NORMAL

## 2024-07-05 NOTE — TELEPHONE ENCOUNTER
Final Pathologic Diagnosis   Liver, native (needle biopsy):  - Mild steatohepatitis  - Macrosteatosis, 40%  - Microsteatosis, 25%  - Glycogenated nuclei  - No significant plasma cell population  - Iron stain:  Negative  - Trichrome stain:  No fibrosis  - Special stains with appropriate controls  - PASD and Copper will be issued in a supplemental report    NAFLD Activity Score    Steatosis score 2  Lobular inflammation Score 2  Hepatocyte ballooning Score 1    CARLA Score 5 out of 8     Patient informed of the findings in her liver biopsy (above).  She will get a lipid panel done on 7/22/24, and see PCP for any treatment of elevated lipids.      Recommend:   -  Low Carbohydrate Diet:  Limit intake of listed foods (below) to half of current intake.    Foods to limit are: Rice, potatoes, corn, corn starch- and flour-containing food products (e.g., breads, pastas, cookies, cakes, etc.), sweets and fruits.  Avoid alcohol.    Proteins can be substituted in place of carbs.  Generally fish is good as a source of fish oil.   -  Regular exercise.   -  Add lipid profile on 7/22/24 labs.   -  Liver profile every 6 weeks, after August 16 labs.   -  Clinic appt in 3 months/talk on the phone.            SASCHA Grewal MD  Hepatology

## 2024-07-05 NOTE — TELEPHONE ENCOUNTER
[facility] Liver Pathology Conference Note    Patient:  Kailee Messer  MRN:   [id number]  YOB: 1995  Date of Transplant:  N/A  Native Diagnosis:     Discussion/Plan:    Presenter: Hepatologist - Netta Grewal MD    Reason for presenting: elevated liver function  Elevated transaminases and positive MEENAKSHI.     Concerns for Pathologists:     Lab Results  WBC (K/uL)   Date Value   06/26/2024 8.15   05/13/2024 6.76   04/30/2024 7.79     PLT (K/uL)   Date Value   06/26/2024 252   05/13/2024 262   04/30/2024 332     INR (no units)   Date Value   06/26/2024 0.9   05/13/2024 0.9   04/30/2024 1.0     AST (U/L)   Date Value   06/17/2024 58 (H)     ALT (U/L)   Date Value   06/17/2024 86 (H)   05/13/2024 69 (H)   04/30/2024 69 (H)     BILITOT (mg/dL)   Date Value   06/17/2024 0.8   05/13/2024 0.6   04/30/2024 0.4     ALKPHOS (U/L)   Date Value   06/17/2024 85   05/13/2024 85   04/30/2024 83     CREATININE (mg/dL)   Date Value   06/17/2024 0.7   05/13/2024 0.7   04/30/2024 0.7     AFP (ng/mL)   Date Value   05/13/2024 <2.0   04/30/2024 <2.0     CMVIGGINTERP (no units)   Date Value   04/10/2008 Positive - Detectable Ab (A)

## 2024-07-09 ENCOUNTER — TELEPHONE (OUTPATIENT)
Dept: HEPATOLOGY | Facility: CLINIC | Age: 29
End: 2024-07-09
Payer: COMMERCIAL

## 2024-07-11 ENCOUNTER — CONFERENCE (OUTPATIENT)
Dept: TRANSPLANT | Facility: CLINIC | Age: 29
End: 2024-07-11
Payer: COMMERCIAL

## 2024-07-11 NOTE — TELEPHONE ENCOUNTER
7/11/24 Liver Pathology Conference:    Looks like Random Liver Biopsy: steatohepatitis ,; no fibrosis/ no mass noted on slide      Plan: verify with  if a mass biopsy is needed

## 2024-07-22 ENCOUNTER — LAB VISIT (OUTPATIENT)
Dept: LAB | Facility: HOSPITAL | Age: 29
End: 2024-07-22
Payer: COMMERCIAL

## 2024-07-22 DIAGNOSIS — E78.00 ELEVATED CHOLESTEROL: ICD-10-CM

## 2024-07-22 LAB
CHOLEST SERPL-MCNC: 282 MG/DL (ref 120–199)
CHOLEST/HDLC SERPL: 4.5 {RATIO} (ref 2–5)
HDLC SERPL-MCNC: 63 MG/DL (ref 40–75)
HDLC SERPL: 22.3 % (ref 20–50)
LDLC SERPL CALC-MCNC: 175 MG/DL (ref 63–159)
NONHDLC SERPL-MCNC: 219 MG/DL
TRIGL SERPL-MCNC: 220 MG/DL (ref 30–150)

## 2024-07-22 PROCEDURE — 36415 COLL VENOUS BLD VENIPUNCTURE: CPT | Performed by: INTERNAL MEDICINE

## 2024-07-22 PROCEDURE — 80061 LIPID PANEL: CPT | Performed by: INTERNAL MEDICINE

## 2024-07-24 ENCOUNTER — TELEPHONE (OUTPATIENT)
Dept: HEPATOLOGY | Facility: CLINIC | Age: 29
End: 2024-07-24
Payer: COMMERCIAL

## 2024-07-24 ENCOUNTER — PATIENT MESSAGE (OUTPATIENT)
Dept: HEPATOLOGY | Facility: CLINIC | Age: 29
End: 2024-07-24
Payer: COMMERCIAL

## 2024-07-24 NOTE — TELEPHONE ENCOUNTER
----- Message from Manolo Angeles MA sent at 7/24/2024 10:14 AM CDT -----    ----- Message -----  From: Netta Grewal MD  Sent: 7/23/2024   3:12 PM CDT  To: [facility] Staff    Please instruct patient:  Lipid profile shows elevated cholesterol and triglycerides.  Needs to see her PCP for abnormal lipid profile.  They may have to put her on a diet and/or cholesterol lowering medication, or repeat the test in a few weeks.  It is important to have the lipids in a normal range, as elevated lipids can promote fatty liver and inflammation in the liver.  Thanks.

## 2024-07-25 NOTE — PROGRESS NOTES
[facility] Consultation Note    Reason for Consult:  The primary encounter diagnosis was Irritable bowel syndrome with diarrhea. A diagnosis of Abdominal bloating was also pertinent to this visit.    PCP:   Noemi Marsh       Referring MD:  No referring provider defined for this encounter.    Initial History of Present Illness (HPI):  This is a 29 y.o. female here for evaluation of IBS-D, had been seeing Dr Staley  Thinks she has IBS  Tried probiotics for 30 days but was under a lot of stress  Did cut out garlic and onions which has reduced    Symptoms started around 2020 - big stressors at that time  Lots of fatigue at that time  Now with better job - decreasing prozac  But still with significant bloating, frequent diarrhea    Drinks water and ginger ale to help settle things down    Takes nexium OTC +/- tums    Interval History 07/26/2024  Now taking ppi only once a week after qd for 6-8 weeks  Drinking less EtoH now  Watching portions  Still having some bloating issues  Felt worse on the xifaxan for some reason - icky and more diarrhea. Didn't feel good    ROS:  Constitutional: No fevers, chills, No weight loss  ENT: No allergies  CV: No chest pain  Pulm: No cough, No shortness of breath  Ophtho: No vision changes  GI: see HPI  Derm: No rash  Heme: No lymphadenopathy, No bruising  MSK: No arthritis  : No dysuria, No hematuria  Endo: No hot or cold intolerance  Neuro: No syncope, No seizure  Psych: No anxiety, No depression    Medical History:  has a past medical history of Acid reflux, Counseling for HPV (human papillomavirus) vaccination (2020), Dysmenorrhea, and Uses vaginal contraceptive ring.    Surgical History:  has a past surgical history that includes [place] tooth extraction; Foot surgery (Left); and Esophagogastroduodenoscopy (N/A, 4/18/2024).    Review of patient's allergies indicates:  No Known Allergies    Medication List with Changes/Refills   Current Medications  "   DICYCLOMINE (BENTYL) 10 MG CAPSULE    Take 1 capsule (10 mg total) by mouth before meals as needed (abdominal pain).    ETONOGESTREL-ETHINYL ESTRADIOL (NUVARING) 0.12-0.015 MG/24 HR VAGINAL RING    INSERT ONE RING VAGINALLY  EVERY 21 DAYS CONTINUOUSLY.    HEPATITIS B VACC-CPG 1018, PF, (HEPLISAV-B, PF,) 20 MCG/0.5 ML SYRG    to be administer by pharmd    ONDANSETRON (ZOFRAN) 4 MG TABLET    Take 1 tablet (4 mg total) by mouth every 8 (eight) hours as needed for Nausea.    PANTOPRAZOLE (PROTONIX) 40 MG TABLET    Take 1 tablet (40 mg total) by mouth once daily.         Objective Findings:    Vital Signs:  BP (!) 135/90   Pulse 102   Ht 5' 4" (1.626 m)   Wt 94.4 kg (208 lb 1.8 oz)   LMP 07/02/2024   BMI 35.72 kg/m²   Body mass index is 35.72 kg/m².    Physical Exam:  General Appearance: Well appearing in no acute distress  Head:   Normocephalic, without obvious abnormality  Eyes:    No scleral icterus, EOMI  ENT: Neck supple, Lips, mucosa, and tongue normal; teeth and gums normal  Lungs: CTA bilaterally in anterior and posterior fields, no wheezes, no crackles.  Heart:  Regular rate and rhythm, S1, S2 normal, no murmurs heard  Abdomen: Soft, non tender, non distended with positive bowel sounds in all four quadrants. No hepatosplenomegaly, ascites, or mass  Extremities: 2+ pulses, no clubbing, cyanosis or edema  Skin: No rash  Neurologic: CN II-XII intact      Labs:  Lab Results   Component Value Date    WBC 8.15 06/26/2024    HGB 12.8 06/26/2024    HCT 39.3 06/26/2024     06/26/2024    CHOL 282 (H) 07/22/2024    TRIG 220 (H) 07/22/2024    HDL 63 07/22/2024    ALT 86 (H) 06/17/2024    AST 58 (H) 06/17/2024     06/17/2024    K 4.4 06/17/2024     06/17/2024    CREATININE 0.7 06/17/2024    BUN 8 06/17/2024    CO2 24 06/17/2024    TSH 1.785 09/22/2023    INR 0.9 06/26/2024    HGBA1C 5.6 06/28/2024       No results found for: "HPYLORINTERP"  No results found for: "HPYLORIANTIG"    Stool samples - " wnl  Outside labs - wnl, CRP 12    Imaging:    Endoscopy:    Colon 21 - wnl      Assessment:  1. Irritable bowel syndrome with diarrhea    2. Abdominal bloating               Recommendations:  1. Check celiac testing - wnl, Recheck CRP as it was elevated outside - mild elevation  2. Breath testing for SIBO as she had atypical response to xifaxan  3. Bentyl prn - helps some  4. Will start pamelor    No follow-ups on file.      Order summary:           Thank you so much for allowing me to participate in the care of Kailee Owens MD

## 2024-07-26 ENCOUNTER — OFFICE VISIT (OUTPATIENT)
Dept: GASTROENTEROLOGY | Facility: CLINIC | Age: 29
End: 2024-07-26
Payer: COMMERCIAL

## 2024-07-26 VITALS
HEIGHT: 64 IN | WEIGHT: 208.13 LBS | DIASTOLIC BLOOD PRESSURE: 90 MMHG | HEART RATE: 102 BPM | BODY MASS INDEX: 35.53 KG/M2 | SYSTOLIC BLOOD PRESSURE: 135 MMHG

## 2024-07-26 DIAGNOSIS — R14.0 ABDOMINAL BLOATING: ICD-10-CM

## 2024-07-26 DIAGNOSIS — K58.0 IRRITABLE BOWEL SYNDROME WITH DIARRHEA: Primary | ICD-10-CM

## 2024-07-26 PROCEDURE — 99999 PR PBB SHADOW E&M-EST. PATIENT-LVL III: CPT | Mod: PBBFAC,,, | Performed by: INTERNAL MEDICINE

## 2024-07-26 RX ORDER — NORTRIPTYLINE HYDROCHLORIDE 10 MG/1
10 CAPSULE ORAL NIGHTLY
Qty: 30 CAPSULE | Refills: 11 | Status: SHIPPED | OUTPATIENT
Start: 2024-07-26 | End: 2025-07-26

## 2024-07-26 NOTE — PROGRESS NOTES
"GENERAL GI PATIENT INTAKE:    COVID symptoms in the last 7 days (runny nose, sore throat, congestion, cough, fever): No  PCP: Noemi Marsh  If not PCP-  number given to establish [phone]: N/A    ALLERGIES REVIEWED:  Yes    CHIEF COMPLAINT:    Chief Complaint   Patient presents with    Follow-up       VITAL SIGNS:  BP (!) 135/90   Pulse 102   Ht 5' 4" (1.626 m)   Wt 94.4 kg (208 lb 1.8 oz)   [facility] 07/02/2024   BMI 35.72 kg/m²      Change in medical, surgical, family or social history: No      REVIEWED MEDICATION LIST RECONCILED INCLUDING ABOVE MEDS:  Yes     "

## 2024-08-05 ENCOUNTER — PATIENT MESSAGE (OUTPATIENT)
Dept: GASTROENTEROLOGY | Facility: CLINIC | Age: 29
End: 2024-08-05
Payer: COMMERCIAL

## 2024-08-16 ENCOUNTER — OFFICE VISIT (OUTPATIENT)
Dept: OBSTETRICS AND GYNECOLOGY | Facility: CLINIC | Age: 29
End: 2024-08-16
Payer: COMMERCIAL

## 2024-08-16 VITALS — BODY MASS INDEX: 34.4 KG/M2 | HEIGHT: 64 IN | WEIGHT: 201.5 LBS

## 2024-08-16 DIAGNOSIS — Z11.51 ENCOUNTER FOR SCREENING FOR HUMAN PAPILLOMAVIRUS (HPV): Primary | ICD-10-CM

## 2024-08-16 DIAGNOSIS — R87.612 LGSIL ON PAP SMEAR OF CERVIX: ICD-10-CM

## 2024-08-16 DIAGNOSIS — Z01.419 ENCOUNTER FOR GYNECOLOGICAL EXAMINATION (GENERAL) (ROUTINE) WITHOUT ABNORMAL FINDINGS: ICD-10-CM

## 2024-08-16 DIAGNOSIS — R87.810 CERVICAL HIGH RISK HPV (HUMAN PAPILLOMAVIRUS) TEST POSITIVE: ICD-10-CM

## 2024-08-16 DIAGNOSIS — Z12.4 ENCOUNTER FOR PAPANICOLAOU SMEAR FOR CERVICAL CANCER SCREENING: ICD-10-CM

## 2024-08-16 PROCEDURE — 99999 PR PBB SHADOW E&M-EST. PATIENT-LVL III: CPT | Mod: PBBFAC,,, | Performed by: OBSTETRICS & GYNECOLOGY

## 2024-08-16 RX ORDER — ETONOGESTREL AND ETHINYL ESTRADIOL VAGINAL RING .015; .12 MG/D; MG/D
RING VAGINAL
Qty: 3 EACH | Refills: 3 | Status: SHIPPED | OUTPATIENT
Start: 2024-08-16

## 2024-08-16 NOTE — PROGRESS NOTES
Subjective:       Patient ID: Kailee Messer is a 29 y.o. female.    Chief Complaint:  Annual Exam (Last pap/hpv abnormal: LGSIL-HPV+other)        History of Present Illness  Kailee Messer is a 29 y.o. female  who presents for annual. Since I saw her she had owrkup for a mass on her liver and elevated liver enzymes and MEENAKSHI positive. Biopsy was fatty liver. She has been on Nuvaring for awhile. Discussed this could be related to the liver lesion. Discussed Mirena as another option. Hepatologist did not say she has to get off. Will think about it and let me know. Previous abnl pap, repeat today then colpo if abnl. All questions answered.     Patient's last menstrual period was 2024 (exact date).   Date of Last Pap: 2024    Review of Systems  Review of Systems   Constitutional:  Negative for chills and fever.        Objective:   Physical Exam:   Constitutional: She is oriented to person, place, and time. Vital signs are normal. She appears well-developed and well-nourished. No distress.        Pulmonary/Chest: She exhibits no mass. Right breast exhibits no mass, no nipple discharge, no skin change, no tenderness, no bleeding and no swelling. Left breast exhibits no mass, no nipple discharge, no skin change, no tenderness, no bleeding and no swelling. Breasts are symmetrical.        Abdominal: Soft. Bowel sounds are normal. She exhibits no distension and no mass. There is no abdominal tenderness. There is no rebound.     Genitourinary:    Vagina and uterus normal.   There is no rash, tenderness, lesion or injury on the right labia. There is no rash, tenderness, lesion or injury on the left labia. Cervix is normal. Right adnexum displays no mass, no tenderness and no fullness. Left adnexum displays no mass, no tenderness and no fullness. No erythema, vaginal discharge, tenderness, rectocele, cystocele or prolapse of vaginal walls in the vagina. Cervix exhibits no motion tenderness, no discharge and no  friability. Uterus is not deviated, not enlarged, not fixed, not tender and not hosting fibroids.           Musculoskeletal: Normal range of motion and moves all extremeties.      Lymphadenopathy:        Right: No supraclavicular adenopathy present.        Left: No supraclavicular adenopathy present.    Neurological: She is alert and oriented to person, place, and time.    Skin: Skin is warm and dry.    Psychiatric: She has a normal mood and affect. Her behavior is normal. Judgment normal.        Assessment/ Plan:     1. Encounter for screening for human papillomavirus (HPV)  HPV High Risk Genotypes, PCR      2. Encounter for Papanicolaou smear for cervical cancer screening  Liquid-Based Pap Smear, Screening      3. LGSIL on Pap smear of cervix  Liquid-Based Pap Smear, Screening    HPV High Risk Genotypes, PCR      4. Cervical high risk HPV (human papillomavirus) test positive  Liquid-Based Pap Smear, Screening    HPV High Risk Genotypes, PCR      5. Encounter for gynecological examination (general) (routine) without abnormal findings  etonogestreL-ethinyl estradioL (NUVARING) 0.12-0.015 mg/24 hr vaginal ring          No follow-ups on file.    As of April 1, 2021, the Cures Act has been passed nationally. This new law requires that all doctors progress notes, lab results, pathology reports and radiology reports be released IMMEDIATELY to the patient in the patient portal. That means that the results are released to you at the EXACT same time they are released to me. Therefore, with all of the patients that I have I am not able to reply to each patient exactly when the results come in. So there will be a delay from when you see the results to when I see them and have time to come up with a response to send you. Also I only see these results when I am on the computer at work. So if the results come in over the weekend or after 5 pm of a work day, I will not see them until the next business day. As you can tell, this  is a challenge as a physician to give every patient the quick response they hope for and deserve. So please be patient! Thanks for understanding, Dr. Mclaughlin

## 2024-08-19 ENCOUNTER — LAB VISIT (OUTPATIENT)
Dept: LAB | Facility: HOSPITAL | Age: 29
End: 2024-08-19
Payer: COMMERCIAL

## 2024-08-19 DIAGNOSIS — R16.0 LIVER MASS: ICD-10-CM

## 2024-08-19 LAB
ALBUMIN SERPL BCP-MCNC: 3.7 G/DL (ref 3.5–5.2)
ALP SERPL-CCNC: 81 U/L (ref 55–135)
ALT SERPL W/O P-5'-P-CCNC: 20 U/L (ref 10–44)
ANION GAP SERPL CALC-SCNC: 9 MMOL/L (ref 8–16)
AST SERPL-CCNC: 12 U/L (ref 10–40)
BILIRUB SERPL-MCNC: 0.5 MG/DL (ref 0.1–1)
BUN SERPL-MCNC: 7 MG/DL (ref 6–20)
CALCIUM SERPL-MCNC: 9.3 MG/DL (ref 8.7–10.5)
CHLORIDE SERPL-SCNC: 107 MMOL/L (ref 95–110)
CO2 SERPL-SCNC: 22 MMOL/L (ref 23–29)
CREAT SERPL-MCNC: 0.7 MG/DL (ref 0.5–1.4)
EST. GFR  (NO RACE VARIABLE): >60 ML/MIN/1.73 M^2
GLUCOSE SERPL-MCNC: 166 MG/DL (ref 70–110)
POTASSIUM SERPL-SCNC: 4.5 MMOL/L (ref 3.5–5.1)
PROT SERPL-MCNC: 7.3 G/DL (ref 6–8.4)
SODIUM SERPL-SCNC: 138 MMOL/L (ref 136–145)

## 2024-08-19 PROCEDURE — 36415 COLL VENOUS BLD VENIPUNCTURE: CPT | Performed by: INTERNAL MEDICINE

## 2024-08-19 PROCEDURE — 80053 COMPREHEN METABOLIC PANEL: CPT | Performed by: INTERNAL MEDICINE

## 2024-08-23 ENCOUNTER — PATIENT MESSAGE (OUTPATIENT)
Dept: OBSTETRICS AND GYNECOLOGY | Facility: CLINIC | Age: 29
End: 2024-08-23
Payer: COMMERCIAL

## 2024-10-14 ENCOUNTER — OFFICE VISIT (OUTPATIENT)
Dept: HEPATOLOGY | Facility: CLINIC | Age: 29
End: 2024-10-14
Payer: COMMERCIAL

## 2024-10-14 DIAGNOSIS — K75.81 NASH (NONALCOHOLIC STEATOHEPATITIS): Primary | ICD-10-CM

## 2024-10-14 DIAGNOSIS — K75.81 NONALCOHOLIC STEATOHEPATITIS: ICD-10-CM

## 2024-10-14 PROCEDURE — 99214 OFFICE O/P EST MOD 30 MIN: CPT | Mod: 95,,, | Performed by: INTERNAL MEDICINE

## 2024-10-14 NOTE — PROGRESS NOTES
[facility] Consultation Note    Reason for Consult:  The encounter diagnosis was CARLSON (nonalcoholic steatohepatitis).    PCP: Noemi Marsh       HPI:  This is a 29 y.o. female here for evaluation of: CT scan results - liver mass     Interval Note 10/14/24:    Liver lesion 3.6 cm right hepatic lobe, with MRI using Eovist contrast on 5/29/24: [facility]    Enzymes became elevated, hence liver biopsy obtained on 7/3/24, -  findings and my recommendation below:      CARLA Score 5 out of 8.    -  Mild steatohepatitis, 40% macroSteatosis, 25% microsteatosis, (steatosis Score 2),   -  Lobular inflammation (Score 2),   -  Hepatocyte ballooning score 1.     Final Pathologic Diagnosis    Liver, native (needle biopsy):  - Mild steatohepatitis  - Macrosteatosis, 40%  - Microsteatosis, 25%  - Glycogenated nuclei  - No significant plasma cell population  - Iron stain:  Negative  - Trichrome stain:  No fibrosis  - Special stains with appropriate controls  - PASD and Copper will be issued in a supplemental report    NAFLD Activity Score    Steatosis score 2  Lobular inflammation Score 2  Hepatocyte ballooning Score 1    CARLA Score 5 out of 8      Patient informed of the findings in her liver biopsy (above).  She will get a lipid panel done on 7/22/24, and see PCP for any treatment of elevated lipids.       Recommended:   -  Low Carbohydrate Diet:  Limit intake of listed foods (below) to half of current intake.    Foods to limit are: Rice, potatoes, corn, corn starch- and flour-containing food products (e.g., breads, pastas, cookies, cakes, etc.), sweets and fruits.  Avoid alcohol.    Proteins can be substituted in place of carbs.  Generally fish is good as a source of fish oil.   -  Regular exercise.   -  Add lipid profile on 7/22/24 labs.   -  Liver profile every 6 weeks, after August 16 labs.   -  Clinic appt in 3 months/talk on the phone.        Today 10/14/24:    Patient states: she has lost a little bit of  weight, now weighs 198 lb, on home scale. BMI on my vinicio is 34 kg/m2.  (160 lb would be BMI 27.5).     Will get a fibroscan a year from July 2024, so it would be July 2025.  She is training for a half marathon.       Return in August 2025.       Interval history 5/13/24:  Requested IR to give their opinion on the CT findings of the 4.3 x 3 cm mass in the liver, they have recommended MRI with Eovist, which I will order today.      Labs done on 4/30/24 show mild elevation of AST 49, ALT 60, previously normal (20 and 30).  Will repeat enzymes today.     Interval History: 4/30/24  3/12/24  CT enterography abd pelvis with contrast, done for IBS:    Liver: Hepatic steatosis, normal size liver, Heterogenously enhancing mass within the right lobe of the liver measuring 4.3 x 3.6 cm.     GB normal, bile ducts non-dilated. Spleen normal size.     MRI with liver mass protocol is recommended by the radiologist.       AFP: None available.    LFTs on 9/22/23: all normal.     On birth control vaginal ring (generic nuvaring every 3 weeks), since 2012.          Elevated liver enzymes: No labs available  Abnormal imaging: Yes - 4.6 cm liver mass  Cirrhosis: No  Hepatitis C: No  Hepatitis B: No  Fatty liver: Yes  Encephalopathy: No  Post-hospital discharge: No  Symptoms: none related to liver mass.     Primary hepatic manifestations:  Fatigue:No  Edema:No  Ascites:No  Encephalopathy:No  Abdominal pain:No  GI bleeds: No  Pruritus:No  Weight Changes:No  Changes in Bowel habits: No  Muscle cramps:No    Risk factors for liver disease:  No jaundice  No transfusions  No IVDU  Did not snort cocaine or similar agents  Did not live with anyone with hepatitis B or C  Sexual partner not tested  No hepatotoxic medications  No exposure to industrial toxins  Alcohol: drank more during COVID ([id number]) due to depression and a break-up.  Since 2022, once/twice a week, each time 1-2 drinks if by herself, and 3-4 once/month when with friends.  Can stop  if needed.        ROS:  Constitutional: No fevers, chills, weight changes, fatigue  ENT: No allergies, nosebleeds,   CV: No chest pain  Pulm: No cough, shortness of breath  Ophtho: No vision changes  GI/Liver: see HPI  Derm: No rash, itching  Heme: No swollen glands, bruising  MSK: No joint pains, joint swelling  : No dysuria, hematuria, decrease in urine output  Endo: No hot or cold intolerance  Neuro: No confusion, disorientation, difficulty with sleep, memory, concentration, syncope, seizure  Psych: No anxiety, depression    Medical History:  has a past medical history of Acid reflux, Counseling for HPV (human papillomavirus) vaccination (2020), Dysmenorrhea, and Uses vaginal contraceptive ring.    Surgical History:  has a past surgical history that includes [place] tooth extraction; Foot surgery (Left); and Esophagogastroduodenoscopy (N/A, 4/18/2024).    Family History: family history includes Alcohol abuse in her mother; Breast cancer in her paternal aunt; Cancer in her paternal uncle; Diabetes type II in her paternal grandfather; Heart disease in her paternal grandfather; Hyperlipidemia in her mother; Hypertension in her father, paternal grandfather, paternal grandmother, and sister..     Social History:  reports that she has never smoked. She has never used smokeless tobacco. She reports current alcohol use of about 4.0 standard drinks of alcohol per week. She reports that she does not use drugs.    Review of patient's allergies indicates:  No Known Allergies    Current Outpatient Rx   Medication Sig Dispense Refill    dicyclomine (BENTYL) 10 MG capsule Take 1 capsule (10 mg total) by mouth before meals as needed (abdominal pain). (Patient not taking: Reported on 7/26/2024) 90 capsule 3    etonogestreL-ethinyl estradioL (NUVARING) 0.12-0.015 mg/24 hr vaginal ring INSERT ONE RING VAGINALLY  EVERY 21 DAYS CONTINUOUSLY. 3 each 3    nortriptyline (PAMELOR) 10 MG capsule Take 1 capsule (10 mg total) by mouth  every evening. 30 capsule 11    ondansetron (ZOFRAN) 4 MG tablet Take 1 tablet (4 mg total) by mouth every 8 (eight) hours as needed for Nausea. (Patient not taking: Reported on 8/16/2024) 30 tablet 1    pantoprazole (PROTONIX) 40 MG tablet Take 1 tablet (40 mg total) by mouth once daily. (Patient not taking: Reported on 8/16/2024) 30 tablet 3       Objective Findings:    Vital Signs:  There were no vitals taken for this visit.  There is no height or weight on file to calculate BMI.    Physical Exam:  General Appearance: Well appearing in no acute distress  Head:   Normocephalic, without obvious abnormality  Eyes:    No scleral icterus, EOMI  ENT: Neck supple,   Abdomen: Soft, non tender, non distended with positive bowel sounds in all four quadrants. No hepatosplenomegaly, ascites, or mass  Extremities:  no clubbing, cyanosis or edema  Skin: No rash  Neurologic: Alert, oriented x 3.       Labs:  Lab Results   Component Value Date    WBC 8.15 06/26/2024    HGB 12.8 06/26/2024    HCT 39.3 06/26/2024     06/26/2024    CHOL 282 (H) 07/22/2024    TRIG 220 (H) 07/22/2024    HDL 63 07/22/2024    INR 0.9 06/26/2024    CREATININE 0.7 08/19/2024    BUN 7 08/19/2024    BILITOT 0.5 08/19/2024    ALT 20 08/19/2024    AST 12 08/19/2024    ALKPHOS 81 08/19/2024     08/19/2024    K 4.5 08/19/2024     08/19/2024    CO2 22 (L) 08/19/2024    TSH 1.785 09/22/2023    HGBA1C 5.6 06/28/2024    AFP <2.0 05/13/2024         Current Meds:  Current Outpatient Medications   Medication Sig    dicyclomine (BENTYL) 10 MG capsule Take 1 capsule (10 mg total) by mouth before meals as needed (abdominal pain). (Patient not taking: Reported on 7/26/2024)    etonogestreL-ethinyl estradioL (NUVARING) 0.12-0.015 mg/24 hr vaginal ring INSERT ONE RING VAGINALLY  EVERY 21 DAYS CONTINUOUSLY.    nortriptyline (PAMELOR) 10 MG capsule Take 1 capsule (10 mg total) by mouth every evening.    ondansetron (ZOFRAN) 4 MG tablet Take 1 tablet (4 mg  total) by mouth every 8 (eight) hours as needed for Nausea. (Patient not taking: Reported on 8/16/2024)    pantoprazole (PROTONIX) 40 MG tablet Take 1 tablet (40 mg total) by mouth once daily. (Patient not taking: Reported on 8/16/2024)     Current Facility-Administered Medications   Medication    hepatitis B (HEPLISAV-B) 20 mcg/0.5 mL vaccine 0.5 mL        Imaging:       Endoscopy:      Assessment:    Liver mass, [facility] on MRI w wo contrast, using Eovist contrast.     AST 46, ALT 69 on 4/30/24. At a time when asymptomatic for any infection.    Liver biopsy showed steatohepatitis, no fibrosis, with a CARLA score 5 out of 8.  Liver enzymes have normalized after losing a few pounds.    Will get a fibroscan in July 2025 (a year after Liver biopsy).     EGD on 4/18/24 - esophagitis.   Started Protonix 40 mg/day for EGD findings of esophagitis with no bleeding.       Recommendations:  -  Fibroscan in July 2025.    -  Labs : CMP In early December 2024  -  Avoid alcohol, smoking, sedatives and meds with codeine.  -  Avoid high intake of Tylenol (more than 4 extra-strength pills in one day)  -  Education provided: liver disease, monitoring and follow-up.   -  Return in August 2025.         Follow up in about 10 months (around 8/14/2025).      Order summary:  Orders Placed This Encounter   Procedures    FibroScan (Vibration Controlled Transient Elastography)       Thank you so much for allowing me to participate in the care of Kailee Grewal MD

## 2024-10-14 NOTE — PATIENT INSTRUCTIONS
Recommendations:  -  Fibroscan in July 2025.    -  Labs : CMP In early December 2024  -  Avoid alcohol, smoking, sedatives and meds with codeine.  -  Avoid high intake of Tylenol (more than 4 extra-strength pills in one day)  -  Education provided: liver disease, monitoring and follow-up.   -  Return in August 2025.

## 2024-12-02 ENCOUNTER — PATIENT MESSAGE (OUTPATIENT)
Dept: RESEARCH | Facility: HOSPITAL | Age: 29
End: 2024-12-02
Payer: COMMERCIAL

## 2024-12-09 NOTE — Clinical Note
Recommendations: -  Fibroscan in July 2025.   -  Labs : CMP In early December 2024 -  Avoid alcohol, smoking, sedatives and meds with codeine. -  Avoid high intake of Tylenol (more than 4 extra-strength pills in one day) -  Education provided: liver disease, monitoring and follow-up.  -  Return in August 2025.  Bed in lowest position, wheels locked, appropriate side rails in place/Call bell, personal items and telephone in reach/Instruct patient to call for assistance before getting out of bed or chair/Non-slip footwear when patient is out of bed/[place] to call system/Physically safe environment - no spills, clutter or unnecessary equipment/Purposeful Proactive Rounding/Room/bathroom lighting operational, light cord in reach

## 2024-12-13 RX ORDER — PANTOPRAZOLE SODIUM 40 MG/1
40 TABLET, DELAYED RELEASE ORAL DAILY
Qty: 30 TABLET | Refills: 3 | Status: SHIPPED | OUTPATIENT
Start: 2024-12-13

## 2024-12-17 ENCOUNTER — LAB VISIT (OUTPATIENT)
Dept: LAB | Facility: HOSPITAL | Age: 29
End: 2024-12-17
Attending: INTERNAL MEDICINE
Payer: COMMERCIAL

## 2024-12-17 DIAGNOSIS — R16.0 LIVER MASS: ICD-10-CM

## 2024-12-17 LAB
ALBUMIN SERPL BCP-MCNC: 3.6 G/DL (ref 3.5–5.2)
ALP SERPL-CCNC: 72 U/L (ref 40–150)
ALT SERPL W/O P-5'-P-CCNC: 28 U/L (ref 10–44)
ANION GAP SERPL CALC-SCNC: 8 MMOL/L (ref 8–16)
AST SERPL-CCNC: 23 U/L (ref 10–40)
BILIRUB SERPL-MCNC: 0.3 MG/DL (ref 0.1–1)
BUN SERPL-MCNC: 7 MG/DL (ref 6–20)
CALCIUM SERPL-MCNC: 9.2 MG/DL (ref 8.7–10.5)
CHLORIDE SERPL-SCNC: 107 MMOL/L (ref 95–110)
CO2 SERPL-SCNC: 23 MMOL/L (ref 23–29)
CREAT SERPL-MCNC: 0.7 MG/DL (ref 0.5–1.4)
EST. GFR  (NO RACE VARIABLE): >60 ML/MIN/1.73 M^2
GLUCOSE SERPL-MCNC: 124 MG/DL (ref 70–110)
POTASSIUM SERPL-SCNC: 4.1 MMOL/L (ref 3.5–5.1)
PROT SERPL-MCNC: 7.1 G/DL (ref 6–8.4)
SODIUM SERPL-SCNC: 138 MMOL/L (ref 136–145)

## 2024-12-17 PROCEDURE — 80053 COMPREHEN METABOLIC PANEL: CPT | Performed by: INTERNAL MEDICINE

## 2024-12-17 PROCEDURE — 36415 COLL VENOUS BLD VENIPUNCTURE: CPT | Performed by: INTERNAL MEDICINE

## 2024-12-18 ENCOUNTER — TELEPHONE (OUTPATIENT)
Dept: HEPATOLOGY | Facility: CLINIC | Age: 29
End: 2024-12-18
Payer: COMMERCIAL

## 2024-12-18 NOTE — TELEPHONE ENCOUNTER
Patient has been informed.      ----- Message from Netta Grewal MD sent at 12/18/2024  5:22 AM CST -----  Patient results are OK.

## 2024-12-19 RX ORDER — ONDANSETRON 4 MG/1
4 TABLET, FILM COATED ORAL EVERY 8 HOURS PRN
Qty: 30 TABLET | Refills: 1 | Status: SHIPPED | OUTPATIENT
Start: 2024-12-19

## 2024-12-30 NOTE — PROGRESS NOTES
Patient presents to the clinic with COVID concerns, patient was given the option to see a provider.  Patient is symptomatic and elects to not see a provider, they were given a handout which recognizes their decision to not see the provider today, as well as recommendations to follow up with their PCP.  If their symptoms worsen, they will need to follow up with their PCP, any urgent care clinic, or ER for further evaluation.      Patient's temperature, O2 sats, and pulse were taken for this visit.   There were no vitals filed for this visit.        They were within normal limits.   If not with in normal, they were advised that they should see the provider for evaluation.  However, they adamantly refused despite provider's encouragement.        
Order was written/H&P was completed/Contractions pattern was reviewed/FHR was reviewed/Induction / Augmentation was discussed

## 2025-02-28 ENCOUNTER — OFFICE VISIT (OUTPATIENT)
Dept: DERMATOLOGY | Facility: CLINIC | Age: 30
End: 2025-02-28
Payer: COMMERCIAL

## 2025-02-28 DIAGNOSIS — L21.9 SEBORRHEIC DERMATITIS: ICD-10-CM

## 2025-02-28 DIAGNOSIS — Z12.83 SCREENING EXAM FOR SKIN CANCER: Primary | ICD-10-CM

## 2025-02-28 DIAGNOSIS — D22.9 MULTIPLE BENIGN NEVI: ICD-10-CM

## 2025-02-28 DIAGNOSIS — L71.9 ROSACEA: ICD-10-CM

## 2025-02-28 PROCEDURE — 99999 PR PBB SHADOW E&M-EST. PATIENT-LVL II: CPT | Mod: PBBFAC,,, | Performed by: DERMATOLOGY

## 2025-02-28 RX ORDER — OXYMETAZOLINE HYDROCHLORIDE 30 G/1
CREAM TOPICAL
Qty: 30 G | Refills: 3 | Status: SHIPPED | OUTPATIENT
Start: 2025-02-28

## 2025-02-28 RX ORDER — CLOBETASOL PROPIONATE 0.5 MG/ML
SOLUTION TOPICAL 2 TIMES DAILY
Qty: 50 ML | Refills: 3 | Status: SHIPPED | OUTPATIENT
Start: 2025-02-28

## 2025-02-28 RX ORDER — KETOCONAZOLE 20 MG/ML
SHAMPOO, SUSPENSION TOPICAL
Qty: 120 ML | Refills: 3 | Status: SHIPPED | OUTPATIENT
Start: 2025-03-03

## 2025-02-28 NOTE — PROGRESS NOTES
Subjective:      Patient ID:  Kailee Messer is a 29 y.o. female who presents for   Chief Complaint   Patient presents with    Skin Check     TBSE     HPI  Kailee Messer is a 29 y.o. female who presents for: FBSE screening exam for skin cancer.    New patient    Patient has no specific spots or lesions she is concerned about. Pt would like to go over dry scalp and rosacea concerns.    Pertinent history:  History of blistering sunburns: No  History of tanning bed use: Yes  Family history of melanoma: No  Personal history of mole removal: No  Personal history of skin cancer: No      Review of Systems   Skin:  Positive for activity-related sunscreen use. Negative for daily sunscreen use, recent sunburn and wears hat.   Hematologic/Lymphatic: Does not bruise/bleed easily.       Objective:   Physical Exam   Constitutional: She appears well-developed and well-nourished. No distress.   Neurological: She is alert and oriented to person, place, and time. She is not disoriented.   Psychiatric: She has a normal mood and affect.   Skin:   Areas Examined (abnormalities noted in diagram):   Scalp / Hair Palpated and Inspected  Head / Face Inspection Performed  Neck Inspection Performed  Chest / Axilla Inspection Performed  Abdomen Inspection Performed  Genitals / Buttocks / Groin Inspection Performed  Back Inspection Performed  RUE Inspected  LUE Inspection Performed  RLE Inspected  LLE Inspection Performed  Nails and Digits Inspection Performed                 Diagram Legend     Erythematous scaling macule/papule c/w actinic keratosis       Vascular papule c/w angioma      Pigmented verrucoid papule/plaque c/w seborrheic keratosis      Yellow umbilicated papule c/w sebaceous hyperplasia      Irregularly shaped tan macule c/w lentigo     1-2 mm smooth white papules consistent with Milia      Movable subcutaneous cyst with punctum c/w epidermal inclusion cyst      Subcutaneous movable cyst c/w pilar cyst      Firm pink to brown  papule c/w dermatofibroma      Pedunculated fleshy papule(s) c/w skin tag(s)      Evenly pigmented macule c/w junctional nevus     Mildly variegated pigmented, slightly irregular-bordered macule c/w mildly atypical nevus      Flesh colored to evenly pigmented papule c/w intradermal nevus       Pink pearly papule/plaque c/w basal cell carcinoma      Erythematous hyperkeratotic cursted plaque c/w SCC      Surgical scar with no sign of skin cancer recurrence      Open and closed comedones      Inflammatory papules and pustules      Verrucoid papule consistent consistent with wart     Erythematous eczematous patches and plaques     Dystrophic onycholytic nail with subungual debris c/w onychomycosis     Umbilicated papule    Erythematous-base heme-crusted tan verrucoid plaque consistent with inflamed seborrheic keratosis     Erythematous Silvery Scaling Plaque c/w Psoriasis     See annotation      Assessment / Plan:        Screening exam for skin cancer  Total body skin examination performed today including at least 12 points as noted in physical examination. No lesions suspicious for malignancy noted.  Recommend daily sun protection/avoidance, use of at least SPF 30, broad spectrum sunscreen (OTC drug), skin self examinations, and routine physician surveillance to optimize early detection    Multiple benign nevi  Discussed ABCDE's of nevi.  Monitor for new mole or moles that are becoming bigger, darker, irritated, or developing irregular borders. Brochure provided. Instructed patient to observe lesion(s) for changes and follow up in clinic if changes are noted. Patient to monitor skin at home for new or changing lesions.     Seborrheic dermatitis  Rosacea  -     ketoconazole (NIZORAL) 2 % shampoo; Apply topically twice a week. To scalp x 5 minutes then rinse  Dispense: 120 mL; Refill: 3  -     RHOFADE 1 % Crea; AAA face qday  Dispense: 30 g; Refill: 3  -     clobetasoL (TEMOVATE) 0.05 % external solution; Apply  topically 2 (two) times daily. To scalp prn dryness  Dispense: 50 mL; Refill: 3    PDL discussed:  Referred to our [facility], located at our [facility] location, [address] [place] [address], [phone].  Laser treatments are typically not covered by insurance.  Trial Cerave SA wash face         1 yr  No follow-ups on file.

## 2025-03-10 ENCOUNTER — PATIENT MESSAGE (OUTPATIENT)
Dept: DERMATOLOGY | Facility: CLINIC | Age: 30
End: 2025-03-10
Payer: COMMERCIAL

## 2025-04-22 RX ORDER — NORTRIPTYLINE HYDROCHLORIDE 10 MG/1
10 CAPSULE ORAL NIGHTLY
Qty: 30 CAPSULE | Refills: 11 | Status: SHIPPED | OUTPATIENT
Start: 2025-04-22 | End: 2026-04-22

## 2025-05-29 ENCOUNTER — HOSPITAL ENCOUNTER (OUTPATIENT)
Dept: RADIOLOGY | Facility: HOSPITAL | Age: 30
Discharge: HOME | End: 2025-05-29
Attending: INTERNAL MEDICINE
Payer: COMMERCIAL

## 2025-05-29 DIAGNOSIS — K76.9 LIVER DISEASE, UNSPECIFIED: ICD-10-CM

## 2025-05-29 PROCEDURE — 74183 MRI ABD W/O CNTR FLWD CNTR: CPT | Mod: 26,,, | Performed by: RADIOLOGY

## 2025-05-29 PROCEDURE — A9581 GADOXETATE DISODIUM INJ: HCPCS | Performed by: INTERNAL MEDICINE

## 2025-05-29 PROCEDURE — 25500020 PHARM REV CODE 255: Performed by: INTERNAL MEDICINE

## 2025-05-29 PROCEDURE — 74183 MRI ABD W/O CNTR FLWD CNTR: CPT | Mod: TC

## 2025-05-29 RX ADMIN — GADOXETATE DISODIUM 10 ML: 181.43 INJECTION, SOLUTION INTRAVENOUS at 07:05

## 2025-06-01 ENCOUNTER — RESULTS FOLLOW-UP (OUTPATIENT)
Dept: HEPATOLOGY | Facility: CLINIC | Age: 30
End: 2025-06-01

## 2025-06-01 ENCOUNTER — TELEPHONE (OUTPATIENT)
Dept: HEPATOLOGY | Facility: CLINIC | Age: 30
End: 2025-06-01
Payer: COMMERCIAL

## 2025-06-02 ENCOUNTER — TELEPHONE (OUTPATIENT)
Dept: HEPATOLOGY | Facility: CLINIC | Age: 30
End: 2025-06-02
Payer: COMMERCIAL

## 2025-06-02 ENCOUNTER — CONFERENCE (OUTPATIENT)
Dept: TRANSPLANT | Facility: CLINIC | Age: 30
End: 2025-06-02
Payer: COMMERCIAL

## 2025-06-06 ENCOUNTER — TELEPHONE (OUTPATIENT)
Dept: HEPATOLOGY | Facility: CLINIC | Age: 30
End: 2025-06-06
Payer: COMMERCIAL

## 2025-07-01 ENCOUNTER — OFFICE VISIT (OUTPATIENT)
Dept: HEPATOLOGY | Facility: CLINIC | Age: 30
End: 2025-07-01
Payer: COMMERCIAL

## 2025-07-01 ENCOUNTER — TELEPHONE (OUTPATIENT)
Dept: HEPATOLOGY | Facility: CLINIC | Age: 30
End: 2025-07-01

## 2025-07-01 ENCOUNTER — PROCEDURE VISIT (OUTPATIENT)
Dept: HEPATOLOGY | Facility: CLINIC | Age: 30
End: 2025-07-01
Payer: COMMERCIAL

## 2025-07-01 VITALS
DIASTOLIC BLOOD PRESSURE: 84 MMHG | HEIGHT: 64 IN | RESPIRATION RATE: 18 BRPM | BODY MASS INDEX: 35.08 KG/M2 | TEMPERATURE: 99 F | SYSTOLIC BLOOD PRESSURE: 120 MMHG | WEIGHT: 205.5 LBS | OXYGEN SATURATION: 97 % | HEART RATE: 95 BPM

## 2025-07-01 DIAGNOSIS — K75.81 NONALCOHOLIC STEATOHEPATITIS: Primary | ICD-10-CM

## 2025-07-01 DIAGNOSIS — K75.81 NASH (NONALCOHOLIC STEATOHEPATITIS): ICD-10-CM

## 2025-07-01 PROCEDURE — 99999 PR PBB SHADOW E&M-EST. PATIENT-LVL III: CPT | Mod: PBBFAC,,, | Performed by: INTERNAL MEDICINE

## 2025-07-01 PROCEDURE — 99213 OFFICE O/P EST LOW 20 MIN: CPT | Mod: S$GLB,,, | Performed by: INTERNAL MEDICINE

## 2025-07-01 PROCEDURE — 91200 LIVER ELASTOGRAPHY: CPT | Mod: S$GLB,,, | Performed by: INTERNAL MEDICINE

## 2025-07-01 NOTE — PROGRESS NOTES
[facility] Consultation Note    Reason for Consult:  There were no encounter diagnoses.    PCP: Noemi Marsh       HPI:  This is a 29 y.o. female here for evaluation of: CT scan results - liver mass     Interval History 7/1/25:    F[facility] follow-up showed increase in size from 2.5 x 3.6 cm to 3.7 x 5.6 cm. Worrisome, but supposed to be benign lesion.    Here for steatosis management, has lost wt, but wants to lose wt.       Will encourage low carb diet.           10/14/24:    Liver lesion 3.6 cm right hepatic lobe, with MRI using Eovist contrast on 5/29/24: [facility] on MRI with Eovist.    Enzymes became elevated, hence liver biopsy obtained on 7/3/24, -  findings and my recommendation below:      CARLA Score 5 out of 8.    -  Mild steatohepatitis, 40% macroSteatosis, 25% microsteatosis, (steatosis Score 2),   -  Lobular inflammation (Score 2),   -  Hepatocyte ballooning score 1.     Final Pathologic Diagnosis    Liver, native (needle biopsy):  - Mild steatohepatitis  - Macrosteatosis, 40%  - Microsteatosis, 25%  - Glycogenated nuclei  - No significant plasma cell population  - Iron stain:  Negative  - Trichrome stain:  No fibrosis  - Special stains with appropriate controls  - PASD and Copper will be issued in a supplemental report    NAFLD Activity Score     Steatosis score 2  Lobular inflammation Score 2  Hepatocyte ballooning Score 1    CARLA Score 5 out of 8      Patient informed of the findings in her liver biopsy (above).  She will get a lipid panel done on 7/22/24, and see PCP for any treatment of elevated lipids.       Recommended:   -  Low Carbohydrate Diet:  Limit intake of listed foods (below) to half of current intake.    Foods to limit are: Rice, potatoes, corn, corn starch- and flour-containing food products (e.g., breads, pastas, cookies, cakes, etc.), sweets and fruits.  Avoid alcohol.    Proteins can be substituted in place of carbs.  Generally fish is good as a source of fish oil.   -   Regular exercise.   -  Add lipid profile on 7/22/24 labs.   -  Liver profile every 6 weeks, after August 16 labs.   -  Clinic appt in 3 months/talk on the phone.        Today 10/14/24:    Patient states: she has lost a little bit of weight, now weighs 198 lb, on home scale. BMI on my vinicio is 34 kg/m2.  (160 lb would be BMI 27.5).     Will get a fibroscan a year from July 2024, so it would be July 2025.  She is training for a half marathon.       Return in August 2025.       Interval history 5/13/24:  Requested IR to give their opinion on the CT findings of the 4.3 x 3 cm mass in the liver, they have recommended MRI with Eovist, which I will order today.      Labs done on 4/30/24 show mild elevation of AST 49, ALT 60, previously normal (20 and 30).  Will repeat enzymes today.     Interval History: 4/30/24  3/12/24  CT enterography abd pelvis with contrast, done for IBS:    Liver: Hepatic steatosis, normal size liver, Heterogenously enhancing mass within the right lobe of the liver measuring 4.3 x 3.6 cm.     GB normal, bile ducts non-dilated. Spleen normal size.     MRI with liver mass protocol is recommended by the radiologist.       AFP: None available.    LFTs on 9/22/23: all normal.     On birth control vaginal ring (generic nuvaring every 3 weeks), since 2012.          Elevated liver enzymes: No labs available  Abnormal imaging: Yes - 4.6 cm liver mass  Cirrhosis: No  Hepatitis C: No  Hepatitis B: No  Fatty liver: Yes  Encephalopathy: No  Post-hospital discharge: No  Symptoms: none related to liver mass.     Primary hepatic manifestations:  Fatigue:No  Edema:No  Ascites:No  Encephalopathy:No  Abdominal pain:No  GI bleeds: No  Pruritus:No  Weight Changes:No  Changes in Bowel habits: No  Muscle cramps:No    Risk factors for liver disease:  No jaundice  No transfusions  No IVDU  Did not snort cocaine or similar agents  Did not live with anyone with hepatitis B or C  Sexual partner not tested  No hepatotoxic  medications  No exposure to industrial toxins  Alcohol: drank more during COVID (2013-2409) due to depression and a break-up.  Since 2022, once/twice a week, each time 1-2 drinks if by herself, and 3-4 once/month when with friends.  Can stop if needed.        ROS:  Constitutional: No fevers, chills, weight changes, fatigue  ENT: No allergies, nosebleeds,   CV: No chest pain  Pulm: No cough, shortness of breath  Ophtho: No vision changes  GI/Liver: see HPI  Derm: No rash, itching  Heme: No swollen glands, bruising  MSK: No joint pains, joint swelling  : No dysuria, hematuria, decrease in urine output  Endo: No hot or cold intolerance  Neuro: No confusion, disorientation, difficulty with sleep, memory, concentration, syncope, seizure  Psych: No anxiety, depression    Medical History:  has a past medical history of Acid reflux, Counseling for HPV (human papillomavirus) vaccination (2020), Dysmenorrhea, and Uses vaginal contraceptive ring.    Surgical History:  has a past surgical history that includes [place] tooth extraction; Foot surgery (Left); and Esophagogastroduodenoscopy (N/A, 4/18/2024).    Family History: family history includes Alcohol abuse in her mother; Breast cancer in her paternal aunt; Cancer in her paternal uncle; Diabetes type II in her paternal grandfather; Heart disease in her paternal grandfather; Hyperlipidemia in her mother; Hypertension in her father, paternal grandfather, paternal grandmother, and sister..     Social History:  reports that she has never smoked. She has never used smokeless tobacco. She reports current alcohol use of about 4.0 standard drinks of alcohol per week. She reports that she does not use drugs.    Review of patient's allergies indicates:  No Known Allergies    Current Outpatient Rx   Medication Sig Dispense Refill    clobetasoL (TEMOVATE) 0.05 % external solution Apply topically 2 (two) times daily. To scalp prn dryness 50 mL 3    etonogestreL-ethinyl estradioL  "(NUVARING) 0.12-0.015 mg/24 hr vaginal ring INSERT ONE RING VAGINALLY  EVERY 21 DAYS CONTINUOUSLY. 3 each 3    ketoconazole (NIZORAL) 2 % shampoo Apply topically twice a week. To scalp x 5 minutes then rinse 120 mL 3    nortriptyline (PAMELOR) 10 MG capsule Take 1 capsule (10 mg total) by mouth every evening. 30 capsule 11    ondansetron (ZOFRAN) 4 MG tablet Take 1 tablet (4 mg total) by mouth every 8 (eight) hours as needed for Nausea. 30 tablet 1    pantoprazole (PROTONIX) 40 MG tablet Take 1 tablet (40 mg total) by mouth once daily. 30 tablet 3    RHOFADE 1 % Crea Apply to affected area on face once daily 30 g 3    dicyclomine (BENTYL) 10 MG capsule Take 1 capsule (10 mg total) by mouth before meals as needed (abdominal pain). (Patient not taking: Reported on 7/1/2025) 90 capsule 3       Objective Findings:    Vital Signs:  /84 (BP Location: Left arm, Patient Position: Sitting)   Pulse 95   Temp 98.7 °F (37.1 °C) (Oral)   Resp 18   Ht 5' 4" (1.626 m)   Wt 93.2 kg (205 lb 7.5 oz)   SpO2 97%   BMI 35.27 kg/m²   Body mass index is 35.27 kg/m².    Physical Exam:  General Appearance: Well appearing in no acute distress  Head:   Normocephalic, without obvious abnormality  Eyes:    No scleral icterus, EOMI  ENT: Neck supple,   Abdomen: Soft, non tender, non distended with positive bowel sounds in all four quadrants. No hepatosplenomegaly, ascites, or mass  Extremities:  no clubbing, cyanosis or edema  Skin: No rash  Neurologic: Alert, oriented x 3.       Labs:  Lab Results   Component Value Date    WBC 8.15 06/26/2024    HGB 12.8 06/26/2024    HCT 39.3 06/26/2024     06/26/2024    CHOL 282 (H) 07/22/2024    TRIG 220 (H) 07/22/2024    HDL 63 07/22/2024    INR 0.9 06/26/2024    CREATININE 0.7 12/17/2024    BUN 7 12/17/2024    BILITOT 0.3 12/17/2024    ALT 28 12/17/2024    AST 23 12/17/2024    ALKPHOS 72 12/17/2024     12/17/2024    K 4.1 12/17/2024     12/17/2024    CO2 23 12/17/2024    TSH " 1.785 09/22/2023    HGBA1C 5.6 06/28/2024    AFP <2.0 05/13/2024         Current Meds:  Current Outpatient Medications   Medication Sig    clobetasoL (TEMOVATE) 0.05 % external solution Apply topically 2 (two) times daily. To scalp prn dryness    etonogestreL-ethinyl estradioL (NUVARING) 0.12-0.015 mg/24 hr vaginal ring INSERT ONE RING VAGINALLY  EVERY 21 DAYS CONTINUOUSLY.    ketoconazole (NIZORAL) 2 % shampoo Apply topically twice a week. To scalp x 5 minutes then rinse    nortriptyline (PAMELOR) 10 MG capsule Take 1 capsule (10 mg total) by mouth every evening.    ondansetron (ZOFRAN) 4 MG tablet Take 1 tablet (4 mg total) by mouth every 8 (eight) hours as needed for Nausea.    pantoprazole (PROTONIX) 40 MG tablet Take 1 tablet (40 mg total) by mouth once daily.    RHOFADE 1 % Crea Apply to affected area on face once daily    dicyclomine (BENTYL) 10 MG capsule Take 1 capsule (10 mg total) by mouth before meals as needed (abdominal pain). 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     -  AST and ALT normal followed by elevations, then normal.    Liver biopsy showed steatohepatitis, no fibrosis, with a CARLA score 5 out of 8.  Liver enzymes have normalized after losing a few pounds.    Will get a fibroscan in July 2025 (a year after Liver biopsy).  Recommending Low carb diet.   Fibroscan today 7/1/25: , kPa 3.2.      EGD on 4/18/24 - esophagitis.   Started Protonix 40 mg/day for EGD findings of esophagitis with no bleeding.       Recommendations:  -  Fibroscan in Jan 2026.    -  Labs : CMP In early December 2025  -  Low carb diet  -  Avoid alcohol, smoking, sedatives and meds with codeine.  -  Avoid high intake of Tylenol (more than 4 extra-strength pills in one day)  -  Education  provided: liver disease, monitoring and follow-up.   -  Return in Jan 2026.         Follow up in about 6 months (around 1/1/2026).      Order summary:  No orders of the defined types were placed in this encounter.      Thank you so much for allowing me to participate in the care of Kailee Grewal MD

## 2025-07-01 NOTE — PATIENT INSTRUCTIONS
Recommendations:  -  Fibroscan in Jan 2026.    -  Labs : CMP In early December 2025  -  Low carb diet  -  Avoid alcohol, smoking, sedatives and meds with codeine.  -  Avoid high intake of Tylenol (more than 4 extra-strength pills in one day)  -  Education provided: liver disease, monitoring and follow-up.   -  Return in Jan 2026.

## 2025-07-01 NOTE — Clinical Note
Please send this report to patient;  Fibroscan 7/1/2025 Findings Median liver stiffness score:  3.2 CAP Reading: dB/m:  269   IQR/med %:  5 Interpretation Fibrosis interpretation is based on medial liver stiffness - Kilopascal (kPa).   Fibrosis Stage:  F 0-1 Steatosis interpretation is based on controlled attenuation parameter - (dB/m).   Steatosis Grade:  S2 Comments/Plan:  Fibrosis Stage F0-1 = no fibrosis (scarring) in the liver Steatosis Grade S2 = >34% <67% fat in the liver   Netta Grewal MD

## 2025-07-01 NOTE — TELEPHONE ENCOUNTER
----- Message from Netta Grewal MD sent at 7/1/2025  4:57 PM CDT -----  Recommendations:  -  Fibroscan in Jan 2026.    -  Labs : CMP In early December 2025  -  Low carb diet  -  Avoid alcohol, smoking, sedatives and meds with codeine.  -  Avoid high intake of Tylenol (more than 4 extra-strength pills in one day)  -  Education provided: liver disease, monitoring and follow-up.   -  Return in Jan 2026.

## 2025-07-04 NOTE — PATIENT INSTRUCTIONS
Fibroscan 7/1/2025    Findings  Median liver stiffness score:  3.2  CAP Reading: dB/m:  269     IQR/med %:  5  Interpretation  Fibrosis interpretation is based on medial liver stiffness - Kilopascal (kPa).     Fibrosis Stage:  F 0-1  Steatosis interpretation is based on controlled attenuation parameter - (dB/m).     Steatosis Grade:  S2  Comments/Plan:  Fibrosis Stage F0-1 = no fibrosis (scarring) in the liver  Steatosis Grade S2 = >34% <67% fat in the liver     Netta Grewal MD

## 2025-07-04 NOTE — PROCEDURES
FibroScan Transplant Hepatology    Date/Time: 7/1/2025 4:15 PM    Performed by: Netta Grewal MD  Authorized by: Netta Grewal MD    Diagnosis:  NAFLD    Probe:  XL    Universal Protocol: Patient's identity, procedure and site were verified, confirmatory pause was performed.  Discussed procedure including risks and potential complications.  Questions answered.  Patient verbalizes understanding and wishes to proceed with VCTE.     Procedure: After providing explanations of the procedure, patient was placed in the supine position with right arm in maximum abduction to allow optimal exposure of right lateral abdomen.  Patient was briefly assessed, Testing was performed in the mid-axillary location, 50Hz Shear Wave pulses were applied and the resulting Shear Wave and Propagation Speed detected with a 3.5 MHz ultrasonic signal, using the FibroScan probe, Skin to liver capsule distance and liver parenchyma were accessed during the entire examination with the FibroScan probe, Patient was instructed to breathe normally and to abstain from sudden movements during the procedure, allowing for random measurements of liver stiffness. At least 10 Shear Waves were produced, Individual measurements of each Shear Wave were calculated.  Patient tolerated the procedure well with no complications.  Meets discharge criteria as was dismissed.  Rates pain 0 out of 10.  Patient will follow up with ordering provider to review results.    Findings  Median liver stiffness score:  3.2  CAP Reading: dB/m:  269    IQR/med %:  5  Interpretation  Fibrosis interpretation is based on medial liver stiffness - Kilopascal (kPa).    Fibrosis Stage:  F 0-1  Steatosis interpretation is based on controlled attenuation parameter - (dB/m).    Steatosis Grade:  S2  Comments/Plan:  Fibrosis Stage F0-1 = no fibrosis (scarring) in the liver  Steatosis Grade S2 = >34% <67% fat in the liver    Netta Grewal MD

## 2025-07-07 ENCOUNTER — TELEPHONE (OUTPATIENT)
Dept: HEPATOLOGY | Facility: CLINIC | Age: 30
End: 2025-07-07
Payer: COMMERCIAL

## 2025-07-07 ENCOUNTER — PATIENT MESSAGE (OUTPATIENT)
Facility: CLINIC | Age: 30
End: 2025-07-07
Payer: COMMERCIAL

## 2025-07-07 NOTE — TELEPHONE ENCOUNTER
----- Message from Netta Grewal MD sent at 7/3/2025  8:05 PM CDT -----  Please send this report to patient;    Fibroscan 7/1/2025  Findings  Median liver stiffness score:  3.2  CAP Reading: dB/m:  269     IQR/med %:  5  Interpretation  Fibrosis interpretation is based on medial liver stiffness - Kilopascal (kPa).     Fibrosis Stage:  F 0-1  Steatosis interpretation is based on controlled attenuation parameter - (dB/m).     Steatosis Grade:  S2  Comments/Plan:  Fibrosis Stage F0-1 = no fibrosis (scarring) in the liver  Steatosis Grade S2 = >34% <67% fat in the liver     Netta Grewal MD

## 2025-08-06 ENCOUNTER — PATIENT OUTREACH (OUTPATIENT)
Dept: ADMINISTRATIVE | Facility: HOSPITAL | Age: 30
End: 2025-08-06
Payer: COMMERCIAL

## 2025-08-22 ENCOUNTER — PATIENT OUTREACH (OUTPATIENT)
Dept: ADMINISTRATIVE | Facility: HOSPITAL | Age: 30
End: 2025-08-22
Payer: COMMERCIAL

## 2025-08-22 ENCOUNTER — OFFICE VISIT (OUTPATIENT)
Dept: PRIMARY CARE CLINIC | Facility: CLINIC | Age: 30
End: 2025-08-22
Payer: COMMERCIAL

## 2025-08-22 ENCOUNTER — LAB VISIT (OUTPATIENT)
Dept: LAB | Facility: HOSPITAL | Age: 30
End: 2025-08-22
Attending: STUDENT IN AN ORGANIZED HEALTH CARE EDUCATION/TRAINING PROGRAM
Payer: COMMERCIAL

## 2025-08-22 VITALS
HEART RATE: 92 BPM | SYSTOLIC BLOOD PRESSURE: 130 MMHG | DIASTOLIC BLOOD PRESSURE: 80 MMHG | WEIGHT: 204.81 LBS | BODY MASS INDEX: 35.16 KG/M2 | OXYGEN SATURATION: 97 %

## 2025-08-22 DIAGNOSIS — Z00.00 ANNUAL PHYSICAL EXAM: Primary | ICD-10-CM

## 2025-08-22 DIAGNOSIS — F41.1 GAD (GENERALIZED ANXIETY DISORDER): ICD-10-CM

## 2025-08-22 DIAGNOSIS — Z13.1 SCREENING FOR DIABETES MELLITUS: ICD-10-CM

## 2025-08-22 DIAGNOSIS — Z00.00 ANNUAL PHYSICAL EXAM: ICD-10-CM

## 2025-08-22 LAB
ABSOLUTE EOSINOPHIL (OHS): 0.09 K/UL
ABSOLUTE MONOCYTE (OHS): 0.62 K/UL (ref 0.3–1)
ABSOLUTE NEUTROPHIL COUNT (OHS): 3.29 K/UL (ref 1.8–7.7)
ALBUMIN SERPL BCP-MCNC: 3.9 G/DL (ref 3.5–5.2)
ALP SERPL-CCNC: 70 UNIT/L (ref 40–150)
ALT SERPL W/O P-5'-P-CCNC: 45 UNIT/L (ref 0–55)
ANION GAP (OHS): 9 MMOL/L (ref 8–16)
AST SERPL-CCNC: 46 UNIT/L (ref 0–50)
BASOPHILS # BLD AUTO: 0.07 K/UL
BASOPHILS NFR BLD AUTO: 1 %
BILIRUB SERPL-MCNC: 0.7 MG/DL (ref 0.1–1)
BUN SERPL-MCNC: 6 MG/DL (ref 6–20)
CALCIUM SERPL-MCNC: 8.8 MG/DL (ref 8.7–10.5)
CHLORIDE SERPL-SCNC: 109 MMOL/L (ref 95–110)
CHOLEST SERPL-MCNC: 253 MG/DL (ref 120–199)
CHOLEST/HDLC SERPL: 4.4 {RATIO} (ref 2–5)
CO2 SERPL-SCNC: 22 MMOL/L (ref 23–29)
CREAT SERPL-MCNC: 0.6 MG/DL (ref 0.5–1.4)
EAG (OHS): 117 MG/DL (ref 68–131)
ERYTHROCYTE [DISTWIDTH] IN BLOOD BY AUTOMATED COUNT: 13 % (ref 11.5–14.5)
GFR SERPLBLD CREATININE-BSD FMLA CKD-EPI: >60 ML/MIN/1.73/M2
GLUCOSE SERPL-MCNC: 101 MG/DL (ref 70–110)
HBA1C MFR BLD: 5.7 % (ref 4–5.6)
HCT VFR BLD AUTO: 39.4 % (ref 37–48.5)
HDLC SERPL-MCNC: 58 MG/DL (ref 40–75)
HDLC SERPL: 22.9 % (ref 20–50)
HGB BLD-MCNC: 12.1 GM/DL (ref 12–16)
IMM GRANULOCYTES # BLD AUTO: 0.02 K/UL (ref 0–0.04)
IMM GRANULOCYTES NFR BLD AUTO: 0.3 % (ref 0–0.5)
LDLC SERPL CALC-MCNC: 145.2 MG/DL (ref 63–159)
LYMPHOCYTES # BLD AUTO: 2.6 K/UL (ref 1–4.8)
MCH RBC QN AUTO: 26.5 PG (ref 27–31)
MCHC RBC AUTO-ENTMCNC: 30.7 G/DL (ref 32–36)
MCV RBC AUTO: 86 FL (ref 82–98)
NONHDLC SERPL-MCNC: 195 MG/DL
NUCLEATED RBC (/100WBC) (OHS): 0 /100 WBC
PLATELET # BLD AUTO: 270 K/UL (ref 150–450)
PMV BLD AUTO: 11.3 FL (ref 9.2–12.9)
POTASSIUM SERPL-SCNC: 5 MMOL/L (ref 3.5–5.1)
PROT SERPL-MCNC: 7.2 GM/DL (ref 6–8.4)
RBC # BLD AUTO: 4.56 M/UL (ref 4–5.4)
RELATIVE EOSINOPHIL (OHS): 1.3 %
RELATIVE LYMPHOCYTE (OHS): 38.9 % (ref 18–48)
RELATIVE MONOCYTE (OHS): 9.3 % (ref 4–15)
RELATIVE NEUTROPHIL (OHS): 49.2 % (ref 38–73)
SODIUM SERPL-SCNC: 140 MMOL/L (ref 136–145)
TRIGL SERPL-MCNC: 249 MG/DL (ref 30–150)
TSH SERPL-ACNC: 1.14 UIU/ML (ref 0.4–4)
WBC # BLD AUTO: 6.69 K/UL (ref 3.9–12.7)

## 2025-08-22 PROCEDURE — 36415 COLL VENOUS BLD VENIPUNCTURE: CPT

## 2025-08-22 PROCEDURE — 84443 ASSAY THYROID STIM HORMONE: CPT

## 2025-08-22 PROCEDURE — 99395 PREV VISIT EST AGE 18-39: CPT | Mod: S$GLB,,, | Performed by: STUDENT IN AN ORGANIZED HEALTH CARE EDUCATION/TRAINING PROGRAM

## 2025-08-22 PROCEDURE — 80061 LIPID PANEL: CPT

## 2025-08-22 PROCEDURE — 84460 ALANINE AMINO (ALT) (SGPT): CPT

## 2025-08-22 PROCEDURE — 83036 HEMOGLOBIN GLYCOSYLATED A1C: CPT

## 2025-08-22 PROCEDURE — 99999 PR PBB SHADOW E&M-EST. PATIENT-LVL III: CPT | Mod: PBBFAC,,, | Performed by: STUDENT IN AN ORGANIZED HEALTH CARE EDUCATION/TRAINING PROGRAM

## 2025-08-22 PROCEDURE — 85025 COMPLETE CBC W/AUTO DIFF WBC: CPT
